# Patient Record
Sex: MALE | Race: WHITE | NOT HISPANIC OR LATINO | Employment: OTHER | URBAN - METROPOLITAN AREA
[De-identification: names, ages, dates, MRNs, and addresses within clinical notes are randomized per-mention and may not be internally consistent; named-entity substitution may affect disease eponyms.]

---

## 2024-10-22 ENCOUNTER — OUTSIDE FACILITY SERVICE (OUTPATIENT)
Dept: PAIN MEDICINE | Facility: CLINIC | Age: 79
End: 2024-10-22
Payer: MEDICARE

## 2024-10-22 ENCOUNTER — DOCUMENTATION (OUTPATIENT)
Dept: PAIN MEDICINE | Facility: CLINIC | Age: 79
End: 2024-10-22

## 2024-10-22 PROCEDURE — 99152 MOD SED SAME PHYS/QHP 5/>YRS: CPT | Performed by: STUDENT IN AN ORGANIZED HEALTH CARE EDUCATION/TRAINING PROGRAM

## 2024-10-22 PROCEDURE — 64483 NJX AA&/STRD TFRM EPI L/S 1: CPT | Performed by: STUDENT IN AN ORGANIZED HEALTH CARE EDUCATION/TRAINING PROGRAM

## 2024-10-22 PROCEDURE — 64484 NJX AA&/STRD TFRM EPI L/S EA: CPT | Performed by: STUDENT IN AN ORGANIZED HEALTH CARE EDUCATION/TRAINING PROGRAM

## 2024-10-22 NOTE — PROGRESS NOTES
[facility]  [address] [place] [address]      PROCEDURE: Fluoroscopically-guided  Lumbar Transforaminal Epidural Steroid Injection - LEFT L4/5 and L5/S1    PRE-OP DIAGNOSIS: Lumbar radiculopathy  POST-OP DIAGNOSIS: Lumbar radiculopathy    BLOOD THINNERS (ANTIPLATELETS/ANTICOAGULANTS): Were discussed with the patient and REVA Guidelines were followed.     CONSENT: Risks, benefits and options were explained to the patient, all questions were answered and written informed consent was obtained.     ANESTHESIA: Moderate sedation was required to maintain comfort, safety, and cooperation during the procedure.  The duration of sedation service was over 10 minutes.  Patient received 2mg IV Versed and 50mcg IV fentanyl.  Independent observation and monitoring was performed by Georgiana Du.  The patient's level of consciousness and physiologic status was continually monitored with pulse oximetry, EKG from 0827 to 0846.  There were no complications or adverse events during sedation.  After the sedation patient was taken to the recovery area.      PROCEDURE NOTE: A pre-procedural time out was performed to confirm the correct patient, procedure, side, and site. Standard ASA monitors were applied and oxygen via nasal cannula was provided. All proceduralists donned sterile gloves with masks and surgical hats. The patient was placed prone with pillow under the abdomen and all pressure points padded. The patient's lumbar spine was prepped in standard fashion using Chlorhexidine and draped with sterile towels. The target neuroforamen was identified using oblique fluoroscopy and the superior vertebral body endplate squared. The overlying skin and subcutaneous tissue was anesthetized with 1% lidocaine. A 22 gauge 3.5 inch spinal needle with bent tip was incrementally advanced using intermittent fluoroscopy to the 6 o'clock position of the target pedicle in the mid-neuroforamen using  oblique, AP and lateral intermittent fluoroscopy. After negative aspiration of blood and cerebrospinal fluid, needle placement was confirmed with 1 ml of omnipaque 180 mgI/ml contrast using AP fluoroscopic imaging. Imaging revealed a clear outline of the target spinal nerve with proximal spread of agent through the neuroforamen medially to the epidural space, without evidence of intravascular or intrathecal spread. After negative aspiration, a mixture containing dexamethasone 5 mg steroid and lidocaine 1% - 1 ml local anesthetic for a total volume of 1.5 ml was injected under direct visualization with fluoroscopy. The needle was flushed, removed and a bandage applied.  The same procedure utilizing the same technique was performed at each target level listed above.    EBL: None     COMPLICATIONS: None     The patient was monitored in recovery area until all discharge criteria were met. Vital signs remained stable throughout the procedure and in the recovery area. There were no immediate complications and the patient tolerated the procedure well. Sensory and motor exam was unchanged from baseline. The patient received written discharge instructions prior to discharge.     FOLLOW UP: As scheduled     ADDITIONAL NOTES: []        [facility]       Miky Zambrano MD     CODES:  [id number]  [id number]  [id number]

## 2024-11-04 ENCOUNTER — TELEPHONE (OUTPATIENT)
Dept: PAIN MEDICINE | Facility: CLINIC | Age: 79
End: 2024-11-04

## 2024-11-04 ENCOUNTER — PATIENT MESSAGE (OUTPATIENT)
Dept: PAIN MEDICINE | Facility: CLINIC | Age: 79
End: 2024-11-04
Payer: MEDICARE

## 2024-11-04 NOTE — TELEPHONE ENCOUNTER
Caller: TRICIA    Relationship: SELF    Best call back number: [phone]    What is the best time to reach you: ANY    Who are you requesting to speak with (clinical staff, provider,  specific staff member): CLINICAL    What was the call regarding: EPIDURAL WAS DONE 2 WKS AGO AND DID NOT RELIEVE ANY PAIN- IF ANYTHING PAIN HAS GOTTEN WORSE- HE WOULD LIKE RX FOR SOMETHING TO SLEEP AT NIGHT- IS CURRENTLY TAKING HYDROCODONE LAST NIGHT HE TOOK THE LAST PILL- HAS TAKEN OXYCODONE IN THE PAST AS WELL- HE WOULD PREFER OXYCODONE- HE HAS MRI SCHEDULED ORDERED BY DR MEJIAS- HAS APPT WITH DR MEJIAS ON FRIDAY AS WELL- PLEASE CALL-

## 2024-11-05 DIAGNOSIS — M54.16 LUMBAR RADICULOPATHY: Primary | ICD-10-CM

## 2024-11-05 RX ORDER — GABAPENTIN 300 MG/1
300 CAPSULE ORAL
Qty: 30 CAPSULE | Refills: 0 | Status: SHIPPED | OUTPATIENT
Start: 2024-11-05 | End: 2024-11-08 | Stop reason: SDUPTHER

## 2024-11-05 NOTE — TELEPHONE ENCOUNTER
"Surgery/Procedure:   Lumbar Transforaminal Epidural Steroid Injection - LEFT L4/5 and L5/S1   Surgery/Procedure Date:  10/22/2024  Last visit:   Office Visit with Miky Zambrano MD (10/14/2024)   Next visit: 11/20/2024     Reason for call:    Copied from HUB:    \"Epidural was done 2 wks ago and did not relieve any pain- if anything pain has gotten worse- he would like RX for something to sleep at night- is currently taking hydrocodone last night he took the last pill- has taken oxycodone in the past as well- he would prefer oxycodone- he has MRI scheduled ordered by Dr. Hamilton- has appt with Dr. Hamilton on Friday as well- please call-'    Patient also sent a [employer] message:    \"Dr. Zambrano,     I'm afraid the Epidural Steroid Injection has had no effect on my pain. In fact, it's gotten worse.     I need a prescription for pain medicine for use so I can sleep with the pain. I only take it at night.     I've had Hydrocodone, Oxycodone, and Oxycontin after my knee and back surgeries.      I think Oxycodone would be the best for this situation.      I just used my last Hydrocodone tablet and would like something today.      I have an MRI scheduled for Wednesday and an appointment with Dr. Comer on Friday. Hopefully, something will show up as the cause.     Thank you.\"  "

## 2024-11-08 ENCOUNTER — OFFICE VISIT (OUTPATIENT)
Dept: NEUROSURGERY | Facility: CLINIC | Age: 79
End: 2024-11-08
Payer: MEDICARE

## 2024-11-08 VITALS — BODY MASS INDEX: 32.18 KG/M2 | WEIGHT: 205 LBS | TEMPERATURE: 98.7 F | HEIGHT: 67 IN

## 2024-11-08 DIAGNOSIS — M47.819 FACET ARTHROPATHY: ICD-10-CM

## 2024-11-08 DIAGNOSIS — M54.16 LUMBAR RADICULOPATHY: Primary | ICD-10-CM

## 2024-11-08 DIAGNOSIS — M51.9 LUMBAR DISC DISEASE: ICD-10-CM

## 2024-11-08 PROCEDURE — 1159F MED LIST DOCD IN RCRD: CPT | Performed by: NEUROLOGICAL SURGERY

## 2024-11-08 PROCEDURE — 1160F RVW MEDS BY RX/DR IN RCRD: CPT | Performed by: NEUROLOGICAL SURGERY

## 2024-11-08 PROCEDURE — 99213 OFFICE O/P EST LOW 20 MIN: CPT | Performed by: NEUROLOGICAL SURGERY

## 2024-11-08 RX ORDER — GABAPENTIN 300 MG/1
300 CAPSULE ORAL 3 TIMES DAILY
Qty: 90 CAPSULE | Refills: 2 | Status: SHIPPED | OUTPATIENT
Start: 2024-11-08

## 2024-11-08 NOTE — PROGRESS NOTES
Patient: Julian Yan  : 1945    Primary Care Provider: Rahul Randolph DO    Requesting Provider: As above        History    Chief Complaint: Low back left hip, left leg pain.    History of Present Illness: Mr. Yan is an 79-year-old gentleman is known to my service.  Given progressive back and lower extremity pain with walking and standing intolerance he underwent decompressive laminectomies at L2-3 and L3-4 on 2024.  In the  he did undergone lumbar surgery as well.  When I saw him last in  he was doing well.  However by August he had begun having pain in his low back and left hip that extends into the side of his left calf.  When he for stands up in the morning he is miserable.  After he is up and moving a bit it does erin somewhat.  He has fallen on a couple of occasions.  He reports no bowel or bladder dysfunction.  He has no chest or abdominal symptoms.  An epidural injection by Dr. Zambrano made him worse.    Review of Systems   Constitutional:  Negative for activity change, appetite change, chills, diaphoresis, fatigue, fever and unexpected weight change.   HENT:  Negative for congestion, dental problem, drooling, ear discharge, ear pain, facial swelling, hearing loss, mouth sores, nosebleeds, postnasal drip, rhinorrhea, sinus pressure, sinus pain, sneezing, sore throat, tinnitus, trouble swallowing and voice change.    Eyes:  Negative for photophobia, pain, discharge, redness, itching and visual disturbance.   Respiratory:  Negative for apnea, cough, choking, chest tightness, shortness of breath, wheezing and stridor.    Cardiovascular:  Negative for chest pain, palpitations and leg swelling.   Gastrointestinal:  Negative for abdominal distention, abdominal pain, anal bleeding, blood in stool, constipation, diarrhea, nausea, rectal pain and vomiting.   Endocrine: Negative for cold intolerance, heat intolerance, polydipsia, polyphagia and polyuria.   Genitourinary:   "Negative for decreased urine volume, difficulty urinating, dysuria, enuresis, flank pain, frequency, genital sores, hematuria, penile discharge, penile pain, penile swelling, scrotal swelling, testicular pain and urgency.   Musculoskeletal:  Positive for arthralgias, back pain and gait problem. Negative for joint swelling, myalgias, neck pain and neck stiffness.   Skin:  Negative for color change, pallor, rash and wound.   Allergic/Immunologic: Negative for environmental allergies, food allergies and immunocompromised state.   Neurological:  Negative for dizziness, tremors, seizures, syncope, facial asymmetry, speech difficulty, weakness, light-headedness, numbness and headaches.   Hematological:  Negative for adenopathy. Does not bruise/bleed easily.   Psychiatric/Behavioral:  Negative for agitation, behavioral problems, confusion, decreased concentration, dysphoric mood, hallucinations, self-injury, sleep disturbance and suicidal ideas. The patient is not nervous/anxious and is not hyperactive.    All other systems reviewed and are negative.    The patient's past medical history, past surgical history, family history, and social history have been reviewed at length in the electronic medical record.      Physical Exam:   Temp 98.7 °F (37.1 °C) (Temporal)   Ht 170.2 cm (67\")   Wt 93 kg (205 lb)   BMI 32.11 kg/m²   MUSCULOSKELETAL:  Straight leg raising is negative.  Tashi's Sign is negative.  ROM in the low back is normal.  Tenderness in the back to palpation is not observed.  NEUROLOGICAL:  Strength is intact in the lower extremities to direct testing.  Muscle tone is normal throughout.  Ambulates with a cane.  Sensation is intact to light touch testing throughout.  Deep tendon reflexes are difficult to elicit throughout.  Coordination is intact.      Medical Decision Making    Data Review:   (All imaging studies were personally reviewed unless stated otherwise)  Thoracic MRI study dated 11/6/2024 demonstrates " some signal change within the cord at the T11-12 level where there is mild to moderate canal narrowing.  This is noted on his previous MRI from earlier in the year.    MRI of the lumbar spine dated 9/13/2024 demonstrates changes related to his recent laminectomies.  There is some disc or osteophyte to the left at the L4-5 level that potentially could cause some leg symptoms in the distribution he is described.  There is a laminotomy defect on the left at that level.    Diagnosis:   Lumbar radiculopathy.    Treatment Options:   I have increased his gabapentin to 300 mg 3 times a day.  I am going to check electrodiagnostic studies of his left lower extremity as well as a CT scan to see whether we are dealing with soft disc protrusion or more osteophyte.  He will follow-up with the above      Scribed for Paulino Comer MD by Lauren Muñiz CMA on 11/8/2024 11:08 EST       I, Dr. Comer, personally performed the services described in the documentation, as scribed in my presence, and it is both accurate and complete.

## 2024-11-13 DIAGNOSIS — M54.16 LUMBAR RADICULOPATHY: Primary | ICD-10-CM

## 2024-11-13 RX ORDER — HYDROCODONE BITARTRATE AND ACETAMINOPHEN 5; 325 MG/1; MG/1
1 TABLET ORAL 2 TIMES DAILY PRN
Qty: 30 TABLET | Refills: 0 | Status: SHIPPED | OUTPATIENT
Start: 2024-11-13

## 2024-11-22 ENCOUNTER — TELEPHONE (OUTPATIENT)
Dept: NEUROSURGERY | Facility: CLINIC | Age: 79
End: 2024-11-22

## 2024-11-22 ENCOUNTER — OFFICE VISIT (OUTPATIENT)
Dept: NEUROSURGERY | Facility: CLINIC | Age: 79
End: 2024-11-22
Payer: MEDICARE

## 2024-11-22 VITALS — BODY MASS INDEX: 32.47 KG/M2 | HEIGHT: 67 IN | WEIGHT: 206.9 LBS | TEMPERATURE: 98.4 F

## 2024-11-22 DIAGNOSIS — M54.16 LUMBAR RADICULOPATHY: Primary | ICD-10-CM

## 2024-11-22 DIAGNOSIS — M51.9 LUMBAR DISC DISEASE: ICD-10-CM

## 2024-11-22 PROCEDURE — 99213 OFFICE O/P EST LOW 20 MIN: CPT | Performed by: NEUROLOGICAL SURGERY

## 2024-11-22 PROCEDURE — 1160F RVW MEDS BY RX/DR IN RCRD: CPT | Performed by: NEUROLOGICAL SURGERY

## 2024-11-22 PROCEDURE — 1159F MED LIST DOCD IN RCRD: CPT | Performed by: NEUROLOGICAL SURGERY

## 2024-11-22 NOTE — PROGRESS NOTES
Patient: Julian Yan  : 1945    Primary Care Provider: Rahul Randolph DO    Requesting Provider: As above        History    Chief Complaint: Low back, left hip, left leg pain.    History of Present Illness: Mr. Yan is an 79-year-old gentleman is known to my service. Given progressive back and lower extremity pain with walking and standing intolerance he underwent decompressive laminectomies at L2-3 and L3-4 on 2024. In the  he did undergone lumbar surgery as well. When I saw him last in  he was doing well. However by August he had begun having pain in his low back and left hip that extends into the side of his left calf. When he for stands up in the morning he is miserable. After he is up and moving a bit it does erin somewhat. He has fallen on a couple of occasions. He reports no bowel or bladder dysfunction. He has no chest or abdominal symptoms. An epidural injection by Dr. Zambrano made him worse.  His main complaint now is his left leg giving out.  He has had several falls.  He is now using a cane regularly.  His pain seems to be a bit more tolerable.  The pain involves the left buttock and then the left lateral calf.    Review of Systems   Constitutional:  Negative for activity change, appetite change, chills, diaphoresis, fatigue, fever and unexpected weight change.   HENT:  Negative for congestion, dental problem, drooling, ear discharge, ear pain, facial swelling, hearing loss, mouth sores, nosebleeds, postnasal drip, rhinorrhea, sinus pressure, sinus pain, sneezing, sore throat, tinnitus, trouble swallowing and voice change.    Eyes:  Negative for photophobia, pain, discharge, redness, itching and visual disturbance.   Respiratory:  Negative for apnea, cough, choking, chest tightness, shortness of breath, wheezing and stridor.    Cardiovascular:  Negative for chest pain, palpitations and leg swelling.   Gastrointestinal:  Negative for abdominal distention,  abdominal pain, anal bleeding, blood in stool, constipation, diarrhea, nausea, rectal pain and vomiting.   Endocrine: Negative for cold intolerance, heat intolerance, polydipsia, polyphagia and polyuria.   Genitourinary:  Negative for decreased urine volume, difficulty urinating, dysuria, enuresis, flank pain, frequency, genital sores, hematuria, penile discharge, penile pain, penile swelling, scrotal swelling, testicular pain and urgency.   Musculoskeletal:  Positive for arthralgias and gait problem. Negative for back pain, joint swelling, myalgias, neck pain and neck stiffness.   Skin:  Negative for color change, pallor, rash and wound.   Allergic/Immunologic: Negative for environmental allergies, food allergies and immunocompromised state.   Neurological:  Positive for weakness. Negative for dizziness, tremors, seizures, syncope, facial asymmetry, speech difficulty, light-headedness, numbness and headaches.   Hematological:  Negative for adenopathy. Does not bruise/bleed easily.   Psychiatric/Behavioral:  Negative for agitation, behavioral problems, confusion, decreased concentration, dysphoric mood, hallucinations, self-injury, sleep disturbance and suicidal ideas. The patient is not nervous/anxious and is not hyperactive.    All other systems reviewed and are negative.      The patient's past medical history, past surgical history, family history, and social history have been reviewed at length in the electronic medical record.      Physical Exam:   Deferred    Medical Decision Making    Data Review:   (All imaging studies were personally reviewed unless stated otherwise)  Thoracic MRI study dated 11/6/2024 demonstrates some signal change within the cord at the T11-12 level where there is mild to moderate canal narrowing.  This is noted on his previous MRI from earlier in the year.     MRI of the lumbar spine dated 9/13/2024 demonstrates changes related to his recent laminectomies.  There is some disc or  osteophyte to the left at the L4-5 level that potentially could cause some leg symptoms in the distribution he is described.  There is a laminotomy defect on the left at that level.    CT of the lumbar spine demonstrates degenerative changes.  The finding on the left at L4-5 likely represents some degree of disc protrusion or scar there may be a small calcified portion of the but the entire finding is not a large osteophyte.    Electrodiagnostic studies suggest mild chronic left L4/5 radiculopathy.    Diagnosis:   1.  Left lower extremity weakness of uncertain etiology.  2.  Possible left L5 radiculopathy.    Treatment Options:   I have referred her to physical therapy to work on strengthening of his left leg.  If his pain becomes a more prominent issue then I would consider redo foraminotomy on the left at L4-5 with possible discectomy.  This would be more likely to help with his pain than with his left lower extremity weakness.  He will follow-up with me in several weeks.      Scribed for Paulino Comer MD by Lauren Muñiz CMA on 11/22/2024 10:13 EST       I, Dr. Comer, personally performed the services described in the documentation, as scribed in my presence, and it is both accurate and complete.

## 2024-12-06 DIAGNOSIS — M54.16 LUMBAR RADICULOPATHY: ICD-10-CM

## 2024-12-06 NOTE — TELEPHONE ENCOUNTER
Provider:  Souleymane  Surgery/Procedure:  Lumbar laminectomy decompression, L2-3, L4-5  Surgery/Procedure Date:  4/22/24  Last visit:   11/22/24  Next visit: 12/18/24     Reason for call:  Refill request for Norco pending.     Kp:    1 11/15/2024 [id number] Gabapentin 300MG [facility] L737 90.00 30 04 [place]  New 11/08/2024 [facility]  1 11/13/2024 [id number] Hydrocodone Bitartrate/Ac   325MG/5MG  [facility] L737 30.00 15 10 04 [place]  New 11/13/2024 [facility]  1 11/05/2024 [id number] Gabapentin 300MG Miky Zambrano [facility] L737 30.00 30 04 [place]  New 11/05/2024 [facility]

## 2024-12-09 RX ORDER — HYDROCODONE BITARTRATE AND ACETAMINOPHEN 5; 325 MG/1; MG/1
1 TABLET ORAL 2 TIMES DAILY PRN
Qty: 30 TABLET | Refills: 0 | Status: SHIPPED | OUTPATIENT
Start: 2024-12-09

## 2024-12-18 ENCOUNTER — OFFICE VISIT (OUTPATIENT)
Dept: NEUROSURGERY | Facility: CLINIC | Age: 79
End: 2024-12-18
Payer: MEDICARE

## 2024-12-18 ENCOUNTER — PREP FOR SURGERY (OUTPATIENT)
Dept: OTHER | Facility: HOSPITAL | Age: 79
End: 2024-12-18
Payer: MEDICARE

## 2024-12-18 VITALS — BODY MASS INDEX: 31.66 KG/M2 | HEIGHT: 67 IN | WEIGHT: 201.7 LBS | TEMPERATURE: 98 F

## 2024-12-18 DIAGNOSIS — M51.9 LUMBAR DISC DISEASE: ICD-10-CM

## 2024-12-18 DIAGNOSIS — M51.26 HNP (HERNIATED NUCLEUS PULPOSUS), LUMBAR: Primary | ICD-10-CM

## 2024-12-18 DIAGNOSIS — M47.819 FACET ARTHROPATHY: ICD-10-CM

## 2024-12-18 DIAGNOSIS — M54.16 LUMBAR RADICULOPATHY: Primary | ICD-10-CM

## 2024-12-18 DIAGNOSIS — Z98.890 S/P LUMBAR LAMINECTOMY: ICD-10-CM

## 2024-12-18 PROCEDURE — 99214 OFFICE O/P EST MOD 30 MIN: CPT | Performed by: NEUROLOGICAL SURGERY

## 2024-12-18 RX ORDER — CHLORHEXIDINE GLUCONATE 40 MG/ML
SOLUTION TOPICAL
Qty: 120 ML | Refills: 0 | Status: SHIPPED | OUTPATIENT
Start: 2024-12-18

## 2024-12-18 RX ORDER — FAMOTIDINE 20 MG/1
20 TABLET, FILM COATED ORAL
OUTPATIENT
Start: 2024-12-18

## 2024-12-18 NOTE — PROGRESS NOTES
Patient: Julian Yan  : 1945    Primary Care Provider: Rahul Randolph DO    Requesting Provider: As above        History    Chief Complaint: Low back and left leg pain with lower extremity weakness.    History of Present Illness: Mr. Yan is an 79-year-old gentleman is known to my service. Given progressive back and lower extremity pain with walking and standing intolerance he underwent decompressive laminectomies at L2-3 and L3-4 on 2024. In the  he did undergone lumbar surgery as well. When I saw him last in  he was doing well. However by August he had begun having pain in his low back and left hip that extends into the side of his left calf. When he for stands up in the morning he is miserable. After he is up and moving a bit it does erin somewhat. He has fallen on a couple of occasions. He reports no bowel or bladder dysfunction. He has no chest or abdominal symptoms. An epidural injection by Dr. Zambrano made him worse.  His main complaint now is his left leg giving out.  He has had several falls.  He is now using a cane regularly.  His pain seems to be a bit more tolerable.  The pain involves the left buttock and then the left lateral calf.  The gabapentin was not helpful and he stopped it.  If he coughs or sneezes then the pain extends into his left lateral calf and his leg feels wobbly on him.    Review of Systems   Constitutional:  Negative for activity change, appetite change, chills, diaphoresis, fatigue, fever and unexpected weight change.   HENT:  Negative for congestion, dental problem, drooling, ear discharge, ear pain, facial swelling, hearing loss, mouth sores, nosebleeds, postnasal drip, rhinorrhea, sinus pressure, sinus pain, sneezing, sore throat, tinnitus, trouble swallowing and voice change.    Eyes:  Negative for photophobia, pain, discharge, redness, itching and visual disturbance.   Respiratory:  Negative for apnea, cough, choking, chest  "tightness, shortness of breath, wheezing and stridor.    Cardiovascular:  Negative for chest pain, palpitations and leg swelling.   Gastrointestinal:  Negative for abdominal distention, abdominal pain, anal bleeding, blood in stool, constipation, diarrhea, nausea, rectal pain and vomiting.   Endocrine: Negative for cold intolerance, heat intolerance, polydipsia, polyphagia and polyuria.   Genitourinary:  Negative for decreased urine volume, difficulty urinating, dysuria, enuresis, flank pain, frequency, genital sores, hematuria, penile discharge, penile pain, penile swelling, scrotal swelling, testicular pain and urgency.   Musculoskeletal:  Positive for back pain and gait problem. Negative for arthralgias, joint swelling, myalgias, neck pain and neck stiffness.   Skin:  Negative for color change, pallor, rash and wound.   Allergic/Immunologic: Negative for environmental allergies, food allergies and immunocompromised state.   Neurological:  Positive for weakness. Negative for dizziness, tremors, seizures, syncope, facial asymmetry, speech difficulty, light-headedness, numbness and headaches.   Hematological:  Negative for adenopathy. Does not bruise/bleed easily.   Psychiatric/Behavioral:  Negative for agitation, behavioral problems, confusion, decreased concentration, dysphoric mood, hallucinations, self-injury, sleep disturbance and suicidal ideas. The patient is not nervous/anxious and is not hyperactive.      The patient's past medical history, past surgical history, family history, and social history have been reviewed at length in the electronic medical record.      Physical Exam:   Temp 98 °F (36.7 °C) (Infrared)   Ht 170.2 cm (67.01\")   Wt 91.5 kg (201 lb 11.2 oz)   BMI 31.58 kg/m²   Straight leg raising is negative.  Strength is intact in his lower extremities.  Sensation is intact to light touch testing.  Reflexes are difficult to elicit throughout.    Medical Decision Making    Data Review:   (All " imaging studies were personally reviewed unless stated otherwise)  Thoracic MRI study dated 11/6/2024 demonstrates some signal change within the cord at the T11-12 level where there is mild to moderate canal narrowing.  This is noted on his previous MRI from earlier in the year.     MRI of the lumbar spine dated 9/13/2024 demonstrates changes related to his recent laminectomies.  There is some disc or osteophyte to the left at the L4-5 level that potentially could cause some leg symptoms in the distribution he is described.  In reviewing the imaging again I think there may be some extruded disc below the disc space.  There is a laminotomy defect on the left at that level.     CT of the lumbar spine demonstrates degenerative changes.  The finding on the left at L4-5 likely represents some degree of disc protrusion or scar there may be a small calcified portion of the disc.  There is not a large osteophyte.     Electrodiagnostic studies suggest mild chronic left L4/5 radiculopathy.    Diagnosis:   Left L5 radiculopathy due to recess and foraminal stenosis with disc herniation.    Treatment Options:   Mr. Yan is having intolerable symptoms at this point.  I have recommended redo foraminotomy on the left at L4-5 with probable discectomy.  I think this will help with at least some of his symptoms.  Whether it will correct his gait instability is uncertain.  The nature of the procedure as well as the potential risks, complications, limitations, and alternatives to the procedure were discussed at length with the patient and the patient has agreed to proceed with surgery.      Scribed for Paulino Comer MD by Fiona Enriquez MA on 12/18/2024 10:25 EST      I, Dr. Comer, personally performed the services described in the documentation, as scribed in my presence, and it is both accurate and complete.

## 2024-12-18 NOTE — H&P
Patient: Julian Yan  : 1945     Primary Care Provider: Rahul Randolph DO     Requesting Provider: As above           History     Chief Complaint: Low back and left leg pain with lower extremity weakness.     History of Present Illness: Mr. Yan is an 79-year-old gentleman is known to my service. Given progressive back and lower extremity pain with walking and standing intolerance he underwent decompressive laminectomies at L2-3 and L3-4 on 2024. In the  he did undergone lumbar surgery as well. When I saw him last in  he was doing well. However by August he had begun having pain in his low back and left hip that extends into the side of his left calf. When he for stands up in the morning he is miserable. After he is up and moving a bit it does erin somewhat. He has fallen on a couple of occasions. He reports no bowel or bladder dysfunction. He has no chest or abdominal symptoms. An epidural injection by Dr. Zambrano made him worse.  His main complaint now is his left leg giving out.  He has had several falls.  He is now using a cane regularly.  His pain seems to be a bit more tolerable.  The pain involves the left buttock and then the left lateral calf.  The gabapentin was not helpful and he stopped it.  If he coughs or sneezes then the pain extends into his left lateral calf and his leg feels wobbly on him.     Review of Systems   Constitutional:  Negative for activity change, appetite change, chills, diaphoresis, fatigue, fever and unexpected weight change.   HENT:  Negative for congestion, dental problem, drooling, ear discharge, ear pain, facial swelling, hearing loss, mouth sores, nosebleeds, postnasal drip, rhinorrhea, sinus pressure, sinus pain, sneezing, sore throat, tinnitus, trouble swallowing and voice change.    Eyes:  Negative for photophobia, pain, discharge, redness, itching and visual disturbance.   Respiratory:  Negative for apnea, cough, choking, chest  tightness, shortness of breath, wheezing and stridor.    Cardiovascular:  Negative for chest pain, palpitations and leg swelling.   Gastrointestinal:  Negative for abdominal distention, abdominal pain, anal bleeding, blood in stool, constipation, diarrhea, nausea, rectal pain and vomiting.   Endocrine: Negative for cold intolerance, heat intolerance, polydipsia, polyphagia and polyuria.   Genitourinary:  Negative for decreased urine volume, difficulty urinating, dysuria, enuresis, flank pain, frequency, genital sores, hematuria, penile discharge, penile pain, penile swelling, scrotal swelling, testicular pain and urgency.   Musculoskeletal:  Positive for back pain and gait problem. Negative for arthralgias, joint swelling, myalgias, neck pain and neck stiffness.   Skin:  Negative for color change, pallor, rash and wound.   Allergic/Immunologic: Negative for environmental allergies, food allergies and immunocompromised state.   Neurological:  Positive for weakness. Negative for dizziness, tremors, seizures, syncope, facial asymmetry, speech difficulty, light-headedness, numbness and headaches.   Hematological:  Negative for adenopathy. Does not bruise/bleed easily.   Psychiatric/Behavioral:  Negative for agitation, behavioral problems, confusion, decreased concentration, dysphoric mood, hallucinations, self-injury, sleep disturbance and suicidal ideas. The patient is not nervous/anxious and is not hyperactive.       The patient's past medical history, past surgical history, family history, and social history have been reviewed at length in the electronic medical record.     Past Medical History:   Diagnosis Date    Abnormal ECG 10/20/2022    Allergic rhinitis     Environmental    Ankle sprain     Arthritis     Arthritis of back     BPH (benign prostatic hyperplasia)     Bursitis of hip     CKD (chronic kidney disease), stage III     Colon polyp     Sessile adenomatous colon polyp    CTS (carpal tunnel syndrome)      Diverticulosis     Gout     Hemorrhoids     Hip arthrosis     Hypertension     Knee swelling     Low back pain     Low back strain     Lumbosacral disc disease     Periarthritis of shoulder     Peripheral neuropathy     Renal insufficiency     Shingles     Stress headaches     Tendinitis of knee     Wears glasses      Past Surgical History:   Procedure Laterality Date    APPENDECTOMY      BACK SURGERY Left     Left L4-5 lumbar discectomy    CARPAL TUNNEL RELEASE Left 2021    Left carpal tunnel release; Dr. LELAND Nelson; [facility]    CARPAL TUNNEL RELEASE Right 2020    COLONOSCOPY      EPIDURAL BLOCK      FOOT SURGERY Right     Calcium deposit removed from right foot.    HYDROCELE EXCISION / REPAIR      JOINT REPLACEMENT      Left Knee    LUMBAR LAMINECTOMY DISCECTOMY DECOMPRESSION Bilateral 2024    Procedure: LUMBAR LAMINCECTOMY DECOMPRESSION, L2-3, L4-5;  Surgeon: Paulino Comer MD;  Location:  [facility];  Service: Neurosurgery;  Laterality: Bilateral;    SKIN TAG REMOVAL      Multiple    TOTAL KNEE ARTHROPLASTY Left 2023    Procedure: TOTAL KNEE ARTHROPLASTY - LEFT;  Surgeon: Jeison Nelson MD;  Location:  [facility];  Service: Orthopedics;  Laterality: Left;    VASECTOMY       Family History   Problem Relation Age of Onset    COPD Mother     Hypertension Mother     Heart failure Father     Diabetes Father     Osteoarthritis Sister     Osteoarthritis Brother     Drug abuse Son     Early death Son         Drug overdose    Early death Daughter         Accident caused by DUI     Alcohol abuse Daughter      Social History     Socioeconomic History    Marital status:     Number of children: 5   Tobacco Use    Smoking status: Former     Current packs/day: 0.00     Average packs/day: 0.3 packs/day for 7.4 years (2.5 ttl pk-yrs)     Types: Cigarettes     Start date: 9/3/1962     Quit date: 1967     Years since quittin.6     Passive exposure: Past    Smokeless  "tobacco: Never    Tobacco comments:     Haven't smoked since 1967   Vaping Use    Vaping status: Never Used   Substance and Sexual Activity    Alcohol use: No    Drug use: Never    Sexual activity: Not Currently     Partners: Female     Birth control/protection: Vasectomy           Allergies   Allergen Reactions    Diclo Gel [Diclofenac Sodium] Rash    Nsaids Other (See Comments)     \"Can't take NSAIDS because of my kidneys\" (early stages of CKD)    Salonpas [Camphor-Menthol-Methyl Sal] Hives       Current Outpatient Medications on File Prior to Visit   Medication Sig Dispense Refill    cholecalciferol (VITAMIN D3) 25 MCG (1000 UT) tablet Take 1 tablet by mouth Daily.      HYDROcodone-acetaminophen (NORCO) 5-325 MG per tablet Take 1 tablet by mouth 2 (Two) Times a Day As Needed for Moderate Pain. 30 tablet 0    losartan (COZAAR) 100 MG tablet Take 1 tablet by mouth Daily. 90 tablet 3    [DISCONTINUED] gabapentin (Neurontin) 300 MG capsule Take 1 capsule by mouth 3 (Three) Times a Day. (Patient not taking: Reported on 12/18/2024) 90 capsule 2     No current facility-administered medications on file prior to visit.          Physical Exam:   Temp 98 °F (36.7 °C) (Infrared)   Ht 170.2 cm (67.01\")   Wt 91.5 kg (201 lb 11.2 oz)   BMI 31.58 kg/m²   Straight leg raising is negative.  Strength is intact in his lower extremities.  Sensation is intact to light touch testing.  Reflexes are difficult to elicit throughout.     Medical Decision Making     Data Review:   (All imaging studies were personally reviewed unless stated otherwise)  Thoracic MRI study dated 11/6/2024 demonstrates some signal change within the cord at the T11-12 level where there is mild to moderate canal narrowing.  This is noted on his previous MRI from earlier in the year.     MRI of the lumbar spine dated 9/13/2024 demonstrates changes related to his recent laminectomies.  There is some disc or osteophyte to the left at the L4-5 level that " potentially could cause some leg symptoms in the distribution he is described.  In reviewing the imaging again I think there may be some extruded disc below the disc space.  There is a laminotomy defect on the left at that level.     CT of the lumbar spine demonstrates degenerative changes.  The finding on the left at L4-5 likely represents some degree of disc protrusion or scar there may be a small calcified portion of the disc.  There is not a large osteophyte.     Electrodiagnostic studies suggest mild chronic left L4/5 radiculopathy.     Diagnosis:   Left L5 radiculopathy due to recess and foraminal stenosis with disc herniation.     Treatment Options:   Mr. Yan is having intolerable symptoms at this point.  I have recommended redo foraminotomy on the left at L4-5 with probable discectomy.  I think this will help with at least some of his symptoms.  Whether it will correct his gait instability is uncertain.  The nature of the procedure as well as the potential risks, complications, limitations, and alternatives to the procedure were discussed at length with the patient and the patient has agreed to proceed with surgery.

## 2024-12-30 ENCOUNTER — ANESTHESIA EVENT (OUTPATIENT)
Dept: PERIOP | Facility: HOSPITAL | Age: 79
End: 2024-12-30
Payer: MEDICARE

## 2024-12-30 ENCOUNTER — PRE-ADMISSION TESTING (OUTPATIENT)
Dept: PREADMISSION TESTING | Facility: HOSPITAL | Age: 79
End: 2024-12-30
Payer: MEDICARE

## 2024-12-30 VITALS — HEIGHT: 67 IN | BODY MASS INDEX: 31.56 KG/M2 | WEIGHT: 201.06 LBS

## 2024-12-30 DIAGNOSIS — M51.26 HNP (HERNIATED NUCLEUS PULPOSUS), LUMBAR: ICD-10-CM

## 2024-12-30 LAB
DEPRECATED RDW RBC AUTO: 42.8 FL (ref 37–54)
ERYTHROCYTE [DISTWIDTH] IN BLOOD BY AUTOMATED COUNT: 12.4 % (ref 12.3–15.4)
HBA1C MFR BLD: 5.4 % (ref 4.8–5.6)
HCT VFR BLD AUTO: 40.4 % (ref 37.5–51)
HGB BLD-MCNC: 13.5 G/DL (ref 13–17.7)
MCH RBC QN AUTO: 31.5 PG (ref 26.6–33)
MCHC RBC AUTO-ENTMCNC: 33.4 G/DL (ref 31.5–35.7)
MCV RBC AUTO: 94.4 FL (ref 79–97)
PLATELET # BLD AUTO: 227 10*3/MM3 (ref 140–450)
PMV BLD AUTO: 9.2 FL (ref 6–12)
POTASSIUM SERPL-SCNC: 4.8 MMOL/L (ref 3.5–5.2)
QT INTERVAL: 426 MS
QTC INTERVAL: 403 MS
RBC # BLD AUTO: 4.28 10*6/MM3 (ref 4.14–5.8)
WBC NRBC COR # BLD AUTO: 6.34 10*3/MM3 (ref 3.4–10.8)

## 2024-12-30 PROCEDURE — 93005 ELECTROCARDIOGRAM TRACING: CPT

## 2024-12-30 PROCEDURE — 83036 HEMOGLOBIN GLYCOSYLATED A1C: CPT

## 2024-12-30 PROCEDURE — 85027 COMPLETE CBC AUTOMATED: CPT

## 2024-12-30 PROCEDURE — 36415 COLL VENOUS BLD VENIPUNCTURE: CPT

## 2024-12-30 PROCEDURE — 84132 ASSAY OF SERUM POTASSIUM: CPT

## 2024-12-30 PROCEDURE — 87081 CULTURE SCREEN ONLY: CPT

## 2024-12-30 NOTE — PAT
An arrival time for procedure was not provided during PAT visit. If patient had any questions or concerns about their arrival time, they were instructed to contact their surgeon/physician.  Additionally, if the patient referred to an arrival time that was acquired from their my chart account, patient was encouraged to verify that time with their surgeon/physician. Arrival times are NOT provided in Pre Admission Testing Department.    Per Anesthesia Request, patient instructed not to take their ACE/ARB medications on the AM of surgery.    Patient denies any current skin issues.     Bactroban (if prescribed) and Chlorhexidine Prescription prescribed by physician before PAT visit.  Verified with patient that medication(s) were picked up from their pharmacy.  Written instructions given to patient during PAT visit.  Patient/family also instructed to complete skin prep checklist and return the checklist on the day of surgery to preoperative staff.  Patient/family verbalized understanding.    Patient to apply Chlorhexadine wipes  to surgical area (as instructed) the night before procedure and the AM of procedure. Wipes provided.    Patient viewed general PAT education video as instructed in their preoperative information received from their surgeon.  Patient stated the general PAT education video was viewed in its entirety and survey completed.  Copies of [facility] general education handouts (Incentive Spirometry, Meds to Beds Program, Patient Belongings, Pre-op skin preparation instructions, Blood Glucose testing, Visitor policy, Surgery FAQ, Code H) distributed to patient if not printed. Education related to the PAT pass and skin preparation for surgery (if applicable) completed in PAT as a reinforcement to PAT education video. Patient instructed to return PAT pass provided today as well as completed skin preparation sheet (if applicable) on the day of procedure.     Additionally if patient had not viewed video yet but  intended to view it at home or in our waiting area, then referred them to the handout with QR code/link provided during PAT visit.  Encouraged patient/family to read PAT general education handouts thoroughly and notify PAT staff with any questions or concerns. Patient verbalized understanding of all information and priority content.    Patient EKG on chart and in epic from 12/30/2024    Spoke to Dr Ayon. He reviewed and approved EKG. No further orders given.

## 2024-12-31 LAB — MRSA SPEC QL CULT: NORMAL

## 2025-01-06 ENCOUNTER — HOSPITAL ENCOUNTER (OUTPATIENT)
Facility: HOSPITAL | Age: 80
Discharge: HOME | End: 2025-01-06
Attending: NEUROLOGICAL SURGERY | Admitting: NEUROLOGICAL SURGERY
Payer: MEDICARE

## 2025-01-06 ENCOUNTER — APPOINTMENT (OUTPATIENT)
Dept: GENERAL RADIOLOGY | Facility: HOSPITAL | Age: 80
End: 2025-01-06
Payer: MEDICARE

## 2025-01-06 ENCOUNTER — ANESTHESIA (OUTPATIENT)
Dept: PERIOP | Facility: HOSPITAL | Age: 80
End: 2025-01-06
Payer: MEDICARE

## 2025-01-06 VITALS
WEIGHT: 201 LBS | TEMPERATURE: 97.2 F | BODY MASS INDEX: 31.55 KG/M2 | HEIGHT: 67 IN | RESPIRATION RATE: 14 BRPM | DIASTOLIC BLOOD PRESSURE: 94 MMHG | OXYGEN SATURATION: 95 % | HEART RATE: 52 BPM | SYSTOLIC BLOOD PRESSURE: 159 MMHG

## 2025-01-06 DIAGNOSIS — M54.16 LUMBAR RADICULOPATHY: ICD-10-CM

## 2025-01-06 DIAGNOSIS — M51.26 HNP (HERNIATED NUCLEUS PULPOSUS), LUMBAR: ICD-10-CM

## 2025-01-06 PROCEDURE — 25010000002 PROPOFOL 10 MG/ML EMULSION

## 2025-01-06 PROCEDURE — 25010000002 GLYCOPYRROLATE 1 MG/5ML SOLUTION

## 2025-01-06 PROCEDURE — 63710000001 MUPIROCIN 2 % OINTMENT: Performed by: NEUROLOGICAL SURGERY

## 2025-01-06 PROCEDURE — 25010000002 LIDOCAINE PF 1% 1 % SOLUTION: Performed by: ANESTHESIOLOGY

## 2025-01-06 PROCEDURE — C1713 ANCHOR/SCREW BN/BN,TIS/BN: HCPCS | Performed by: NEUROLOGICAL SURGERY

## 2025-01-06 PROCEDURE — 25010000002 LIDOCAINE PF 1% 1 % SOLUTION

## 2025-01-06 PROCEDURE — A9270 NON-COVERED ITEM OR SERVICE: HCPCS | Performed by: NEUROLOGICAL SURGERY

## 2025-01-06 PROCEDURE — 63710000001 HYDROCODONE-ACETAMINOPHEN 5-325 MG TABLET

## 2025-01-06 PROCEDURE — 25810000003 LACTATED RINGERS PER 1000 ML: Performed by: ANESTHESIOLOGY

## 2025-01-06 PROCEDURE — 25010000002 FENTANYL CITRATE (PF) 100 MCG/2ML SOLUTION

## 2025-01-06 PROCEDURE — 25010000002 SUGAMMADEX 500 MG/5ML SOLUTION

## 2025-01-06 PROCEDURE — A9270 NON-COVERED ITEM OR SERVICE: HCPCS

## 2025-01-06 PROCEDURE — 25010000002 ONDANSETRON PER 1 MG

## 2025-01-06 PROCEDURE — 25010000002 DEXAMETHASONE SODIUM PHOSPHATE 10 MG/ML SOLUTION

## 2025-01-06 PROCEDURE — 63710000001 FAMOTIDINE 20 MG TABLET: Performed by: NEUROLOGICAL SURGERY

## 2025-01-06 PROCEDURE — 76000 FLUOROSCOPY <1 HR PHYS/QHP: CPT

## 2025-01-06 PROCEDURE — 25010000002 CEFAZOLIN PER 500 MG: Performed by: NEUROLOGICAL SURGERY

## 2025-01-06 PROCEDURE — 25010000002 PHENYLEPHRINE 10 MG/ML SOLUTION 1 ML VIAL

## 2025-01-06 DEVICE — AVITENE ULTRAFOAM, 8 CM X 12.5 CM (3-1/8" X 5"), 100 SQ CM
Type: IMPLANTABLE DEVICE | Site: BACK | Status: FUNCTIONAL
Brand: AVITENE

## 2025-01-06 DEVICE — FLOSEAL WITH RECOTHROM - 5ML
Type: IMPLANTABLE DEVICE | Site: BACK | Status: FUNCTIONAL
Brand: FLOSEAL HEMOSTATIC MATRIX

## 2025-01-06 DEVICE — SSC BONE WAX
Type: IMPLANTABLE DEVICE | Site: BACK | Status: FUNCTIONAL
Brand: SSC BONE WAX

## 2025-01-06 RX ORDER — FAMOTIDINE 20 MG/1
20 TABLET, FILM COATED ORAL ONCE
Status: CANCELLED | OUTPATIENT
Start: 2025-01-06 | End: 2025-01-06

## 2025-01-06 RX ORDER — MAGNESIUM HYDROXIDE 1200 MG/15ML
LIQUID ORAL AS NEEDED
Status: DISCONTINUED | OUTPATIENT
Start: 2025-01-06 | End: 2025-01-06 | Stop reason: HOSPADM

## 2025-01-06 RX ORDER — PROPOFOL 10 MG/ML
VIAL (ML) INTRAVENOUS AS NEEDED
Status: DISCONTINUED | OUTPATIENT
Start: 2025-01-06 | End: 2025-01-06 | Stop reason: SURG

## 2025-01-06 RX ORDER — ONDANSETRON 2 MG/ML
4 INJECTION INTRAMUSCULAR; INTRAVENOUS ONCE AS NEEDED
Status: DISCONTINUED | OUTPATIENT
Start: 2025-01-06 | End: 2025-01-06 | Stop reason: HOSPADM

## 2025-01-06 RX ORDER — FENTANYL CITRATE 50 UG/ML
50 INJECTION, SOLUTION INTRAMUSCULAR; INTRAVENOUS
Status: DISCONTINUED | OUTPATIENT
Start: 2025-01-06 | End: 2025-01-06 | Stop reason: HOSPADM

## 2025-01-06 RX ORDER — ONDANSETRON 2 MG/ML
INJECTION INTRAMUSCULAR; INTRAVENOUS AS NEEDED
Status: DISCONTINUED | OUTPATIENT
Start: 2025-01-06 | End: 2025-01-06 | Stop reason: SURG

## 2025-01-06 RX ORDER — BUPIVACAINE HCL/0.9 % NACL/PF 0.125 %
PLASTIC BAG, INJECTION (ML) EPIDURAL AS NEEDED
Status: DISCONTINUED | OUTPATIENT
Start: 2025-01-06 | End: 2025-01-06 | Stop reason: SURG

## 2025-01-06 RX ORDER — HYDRALAZINE HYDROCHLORIDE 20 MG/ML
5 INJECTION INTRAMUSCULAR; INTRAVENOUS
Status: DISCONTINUED | OUTPATIENT
Start: 2025-01-06 | End: 2025-01-06 | Stop reason: HOSPADM

## 2025-01-06 RX ORDER — FENTANYL CITRATE 50 UG/ML
INJECTION, SOLUTION INTRAMUSCULAR; INTRAVENOUS AS NEEDED
Status: DISCONTINUED | OUTPATIENT
Start: 2025-01-06 | End: 2025-01-06 | Stop reason: SURG

## 2025-01-06 RX ORDER — MIDAZOLAM HYDROCHLORIDE 1 MG/ML
0.5 INJECTION, SOLUTION INTRAMUSCULAR; INTRAVENOUS
Status: DISCONTINUED | OUTPATIENT
Start: 2025-01-06 | End: 2025-01-06 | Stop reason: HOSPADM

## 2025-01-06 RX ORDER — SODIUM CHLORIDE 0.9 % (FLUSH) 0.9 %
3-10 SYRINGE (ML) INJECTION AS NEEDED
Status: DISCONTINUED | OUTPATIENT
Start: 2025-01-06 | End: 2025-01-06 | Stop reason: HOSPADM

## 2025-01-06 RX ORDER — EPHEDRINE SULFATE 50 MG/ML
INJECTION INTRAVENOUS AS NEEDED
Status: DISCONTINUED | OUTPATIENT
Start: 2025-01-06 | End: 2025-01-06 | Stop reason: SURG

## 2025-01-06 RX ORDER — HYDROCODONE BITARTRATE AND ACETAMINOPHEN 5; 325 MG/1; MG/1
1 TABLET ORAL ONCE AS NEEDED
Status: DISCONTINUED | OUTPATIENT
Start: 2025-01-06 | End: 2025-01-06 | Stop reason: HOSPADM

## 2025-01-06 RX ORDER — SODIUM CHLORIDE 0.9 % (FLUSH) 0.9 %
3 SYRINGE (ML) INJECTION EVERY 12 HOURS SCHEDULED
Status: DISCONTINUED | OUTPATIENT
Start: 2025-01-06 | End: 2025-01-06 | Stop reason: HOSPADM

## 2025-01-06 RX ORDER — DEXAMETHASONE SODIUM PHOSPHATE 10 MG/ML
INJECTION, SOLUTION INTRAMUSCULAR; INTRAVENOUS AS NEEDED
Status: DISCONTINUED | OUTPATIENT
Start: 2025-01-06 | End: 2025-01-06 | Stop reason: SURG

## 2025-01-06 RX ORDER — SODIUM CHLORIDE 9 MG/ML
9 INJECTION, SOLUTION INTRAVENOUS AS NEEDED
Status: DISCONTINUED | OUTPATIENT
Start: 2025-01-06 | End: 2025-01-06 | Stop reason: HOSPADM

## 2025-01-06 RX ORDER — SODIUM CHLORIDE, SODIUM LACTATE, POTASSIUM CHLORIDE, CALCIUM CHLORIDE 600; 310; 30; 20 MG/100ML; MG/100ML; MG/100ML; MG/100ML
9 INJECTION, SOLUTION INTRAVENOUS CONTINUOUS
Status: DISCONTINUED | OUTPATIENT
Start: 2025-01-07 | End: 2025-01-06 | Stop reason: HOSPADM

## 2025-01-06 RX ORDER — SODIUM CHLORIDE, SODIUM LACTATE, POTASSIUM CHLORIDE, CALCIUM CHLORIDE 600; 310; 30; 20 MG/100ML; MG/100ML; MG/100ML; MG/100ML
9 INJECTION, SOLUTION INTRAVENOUS CONTINUOUS
Status: DISCONTINUED | OUTPATIENT
Start: 2025-01-06 | End: 2025-01-06 | Stop reason: HOSPADM

## 2025-01-06 RX ORDER — LABETALOL HYDROCHLORIDE 5 MG/ML
5 INJECTION, SOLUTION INTRAVENOUS
Status: DISCONTINUED | OUTPATIENT
Start: 2025-01-06 | End: 2025-01-06 | Stop reason: HOSPADM

## 2025-01-06 RX ORDER — HYDROCODONE BITARTRATE AND ACETAMINOPHEN 5; 325 MG/1; MG/1
1 TABLET ORAL 3 TIMES DAILY PRN
Qty: 15 TABLET | Refills: 0 | Status: SHIPPED | OUTPATIENT
Start: 2025-01-06

## 2025-01-06 RX ORDER — HYDROMORPHONE HYDROCHLORIDE 1 MG/ML
0.5 INJECTION, SOLUTION INTRAMUSCULAR; INTRAVENOUS; SUBCUTANEOUS
Status: DISCONTINUED | OUTPATIENT
Start: 2025-01-06 | End: 2025-01-06 | Stop reason: HOSPADM

## 2025-01-06 RX ORDER — LIDOCAINE HYDROCHLORIDE 10 MG/ML
INJECTION, SOLUTION EPIDURAL; INFILTRATION; INTRACAUDAL; PERINEURAL AS NEEDED
Status: DISCONTINUED | OUTPATIENT
Start: 2025-01-06 | End: 2025-01-06 | Stop reason: SURG

## 2025-01-06 RX ORDER — SODIUM CHLORIDE 0.9 % (FLUSH) 0.9 %
10 SYRINGE (ML) INJECTION EVERY 12 HOURS SCHEDULED
Status: DISCONTINUED | OUTPATIENT
Start: 2025-01-06 | End: 2025-01-06 | Stop reason: HOSPADM

## 2025-01-06 RX ORDER — FAMOTIDINE 10 MG/ML
20 INJECTION, SOLUTION INTRAVENOUS ONCE
Status: CANCELLED | OUTPATIENT
Start: 2025-01-06 | End: 2025-01-06

## 2025-01-06 RX ORDER — HYDROCODONE BITARTRATE AND ACETAMINOPHEN 5; 325 MG/1; MG/1
TABLET ORAL
Status: COMPLETED
Start: 2025-01-06 | End: 2025-01-06

## 2025-01-06 RX ORDER — ROCURONIUM BROMIDE 10 MG/ML
INJECTION, SOLUTION INTRAVENOUS AS NEEDED
Status: DISCONTINUED | OUTPATIENT
Start: 2025-01-06 | End: 2025-01-06 | Stop reason: SURG

## 2025-01-06 RX ORDER — LIDOCAINE HYDROCHLORIDE 10 MG/ML
0.5 INJECTION, SOLUTION EPIDURAL; INFILTRATION; INTRACAUDAL; PERINEURAL ONCE AS NEEDED
Status: COMPLETED | OUTPATIENT
Start: 2025-01-06 | End: 2025-01-06

## 2025-01-06 RX ORDER — GLYCOPYRROLATE 0.2 MG/ML
INJECTION INTRAMUSCULAR; INTRAVENOUS AS NEEDED
Status: DISCONTINUED | OUTPATIENT
Start: 2025-01-06 | End: 2025-01-06 | Stop reason: SURG

## 2025-01-06 RX ORDER — IPRATROPIUM BROMIDE AND ALBUTEROL SULFATE 2.5; .5 MG/3ML; MG/3ML
3 SOLUTION RESPIRATORY (INHALATION) ONCE AS NEEDED
Status: DISCONTINUED | OUTPATIENT
Start: 2025-01-06 | End: 2025-01-06 | Stop reason: HOSPADM

## 2025-01-06 RX ORDER — SODIUM CHLORIDE 0.9 % (FLUSH) 0.9 %
10 SYRINGE (ML) INJECTION AS NEEDED
Status: DISCONTINUED | OUTPATIENT
Start: 2025-01-06 | End: 2025-01-06 | Stop reason: HOSPADM

## 2025-01-06 RX ORDER — FAMOTIDINE 20 MG/1
20 TABLET, FILM COATED ORAL
Status: COMPLETED | OUTPATIENT
Start: 2025-01-06 | End: 2025-01-06

## 2025-01-06 RX ORDER — BUPIVACAINE HYDROCHLORIDE AND EPINEPHRINE 2.5; 5 MG/ML; UG/ML
INJECTION, SOLUTION EPIDURAL; INFILTRATION; INTRACAUDAL; PERINEURAL AS NEEDED
Status: DISCONTINUED | OUTPATIENT
Start: 2025-01-06 | End: 2025-01-06 | Stop reason: HOSPADM

## 2025-01-06 RX ADMIN — LIDOCAINE HYDROCHLORIDE 50 MG: 10 INJECTION, SOLUTION EPIDURAL; INFILTRATION; INTRACAUDAL; PERINEURAL at 10:18

## 2025-01-06 RX ADMIN — ROCURONIUM BROMIDE 10 MG: 10 INJECTION INTRAVENOUS at 10:52

## 2025-01-06 RX ADMIN — HYDROCODONE BITARTRATE AND ACETAMINOPHEN 1 TABLET: 5; 325 TABLET ORAL at 11:59

## 2025-01-06 RX ADMIN — SUGAMMADEX 300 MG: 100 INJECTION, SOLUTION INTRAVENOUS at 11:08

## 2025-01-06 RX ADMIN — LIDOCAINE HYDROCHLORIDE 0.5 ML: 10 INJECTION, SOLUTION EPIDURAL; INFILTRATION; INTRACAUDAL; PERINEURAL at 10:07

## 2025-01-06 RX ADMIN — SODIUM CHLORIDE, POTASSIUM CHLORIDE, SODIUM LACTATE AND CALCIUM CHLORIDE 9 ML/HR: 600; 310; 30; 20 INJECTION, SOLUTION INTRAVENOUS at 10:07

## 2025-01-06 RX ADMIN — MUPIROCIN: 20 OINTMENT TOPICAL at 10:09

## 2025-01-06 RX ADMIN — FAMOTIDINE 20 MG: 20 TABLET, FILM COATED ORAL at 10:09

## 2025-01-06 RX ADMIN — PROPOFOL 200 MG: 10 INJECTION, EMULSION INTRAVENOUS at 10:18

## 2025-01-06 RX ADMIN — GLYCOPYRROLATE 0.1 MCG: 1 INJECTION INTRAMUSCULAR; INTRAVENOUS at 10:17

## 2025-01-06 RX ADMIN — PHENYLEPHRINE HYDROCHLORIDE 20 MCG/MIN: 10 INJECTION INTRAVENOUS at 10:32

## 2025-01-06 RX ADMIN — DEXAMETHASONE SODIUM PHOSPHATE 10 MG: 10 INJECTION INTRAMUSCULAR; INTRAVENOUS at 10:18

## 2025-01-06 RX ADMIN — SODIUM CHLORIDE 2 G: 900 INJECTION INTRAVENOUS at 10:24

## 2025-01-06 RX ADMIN — EPHEDRINE SULFATE 5 MG: 50 INJECTION INTRAVENOUS at 10:41

## 2025-01-06 RX ADMIN — Medication 100 MCG: at 10:32

## 2025-01-06 RX ADMIN — FENTANYL CITRATE 100 MCG: 50 INJECTION, SOLUTION INTRAMUSCULAR; INTRAVENOUS at 10:18

## 2025-01-06 RX ADMIN — EPHEDRINE SULFATE 5 MG: 50 INJECTION INTRAVENOUS at 11:08

## 2025-01-06 RX ADMIN — ROCURONIUM BROMIDE 50 MG: 10 INJECTION INTRAVENOUS at 10:18

## 2025-01-06 RX ADMIN — ONDANSETRON 4 MG: 2 INJECTION INTRAMUSCULAR; INTRAVENOUS at 11:06

## 2025-01-06 NOTE — OP NOTE
NEUROSURGICAL OPERATIVE NOTE        PREOPERATIVE DIAGNOSIS:    Recurrent left L4-5 disc herniation and stenosis      POSTOPERATIVE DIAGNOSIS:  Same      PROCEDURE:  Redo left L4-5 laminotomy, medial facetectomy, foraminotomy, and discectomy      SURGEON:  Paulino Comer M.D.      ASSISTANT: Ilana Henning PA-C    PAC assisted with:   Suctioning   Retraction   Tying   Suturing   Closing   Application of dressing   Skilled neurosurgery PA assistance was necessary to perform this procedure.        ANESTHESIA:  General      ESTIMATED BLOOD LOSS: Minimal      SPECIMEN: None      DRAINS: None      COMPLICATIONS:  None      CLINICAL NOTE:  Patient is an 79-year-old gentleman who is well-known to my service.  In April 2024 he underwent decompressive laminectomies at L2-3 and L3-4.  Remotely in the 1980s he had undergone left L4-5 discectomy by another provider.  He has generally done well but has developed progressive back and left leg pain extending into the left lateral calf.  This has been debilitating.  Studies demonstrate recess and foraminal stenosis with some degree of recurrent disc protrusion on the left at L4-5.  As such, he presents at this time for redo decompression on the left at L4-5.  The nature of the procedure as well as the potential risks, complications, limitations, and alternatives to the procedure were discussed at length with the patient and the patient has agreed to proceed with surgery.        TECHNICAL NOTE:  The patient was brought to the operating room and while on his cart, general endotracheal anesthesia was achieved.  He was then turned prone onto the Cloward saddle frame.  Special care was ensured to protect pressure points.  His low back was prepared and draped in usual fashion.  A localizing radiograph was obtained with a spinal needle in the lumbosacral midline utilizing the C-arm.  Based on this, a 3 cm vertical incision was fashioned overlying the L4-5 level.  Underlying tissues were  divided with cautery to provide exposure to the left L4 and L5 hemilamina.  Soft tissues were taken down and laminotomy was extended upward.  Another radiograph confirmed the operative level.  Significant granulation tissue was taken down.  The medial aspect of the facet was resected and undercut with Kerrison punches.  The neural foramen was decompressed with Kerrison punches.  The nerve root and dural sac were retracted medially over subligamentous disk herniation.  The dural sac was somewhat scarred to the disk material.  This was carefully mobilized using blunt dissection.  I incised into the disk and removed disk material.  This allowed for relaxation and good decompression of the thecal sac.  Modest bleeding points were controlled with bipolar cautery.  At completion of the procedure, the dural sac and nerve root were well decompressed and angled ball probe could readily be passed along the nerve root in the foramen.  Bleeding points were controlled with bipolar cautery and Floseal.  With the Valsalva maneuver, there was no significant bleeding or evidence of CSF leak. The wound was swashed out with saline solution.  The paraspinous muscle and fascia were reapproximated in interrupted fashion with 0 Vicryl suture.  Marcaine 0.25% was instilled in the paraspinous musculature and subcutaneous tissues.  Subcutaneous tissues were closed in layers with 3-0 Vicryl suture.  The skin was closed in a running subcuticular fashion with 3-0 Vicryl suture.  A dermal sealant and sterile dressing were applied.  He was rolled onto his cart, extubated, and taken to the recovery room in satisfactory condition.              Paulino Comer M.D.

## 2025-01-06 NOTE — ANESTHESIA PREPROCEDURE EVALUATION
Anesthesia Evaluation     Patient summary reviewed and Nursing notes reviewed   no history of anesthetic complications:   NPO Solid Status: > 8 hours  NPO Liquid Status: > 8 hours           Airway   Mallampati: II  TM distance: >3 FB  Neck ROM: full  No difficulty expected  Dental      Pulmonary - negative pulmonary ROS and normal exam   Cardiovascular - normal exam    (+) hypertension      Neuro/Psych  (+) headaches, numbness, psychiatric history  GI/Hepatic/Renal/Endo    (+) renal disease-    Musculoskeletal     Abdominal    Substance History      OB/GYN          Other                    Anesthesia Plan    ASA 2     general     intravenous induction     Anesthetic plan, risks, benefits, and alternatives have been provided, discussed and informed consent has been obtained with: patient.    Plan discussed with CRNA.    CODE STATUS:

## 2025-01-06 NOTE — H&P
"  Patient Care Team:      Chief complaint: Back and left leg pain    Subjective:  Patient is a 79 y.o.male presents with a previous history of back surgery.  1980 he had L4L5 discectomy on left with good results. 2024 he had L2L3L4 laminectomy with good results. 4 months ago began back and left radiating leg pain. No recent changes in his general health.    Review of Systems:  General ROS: negative  Cardiovascular ROS: no chest pain or dyspnea on exertion  Respiratory ROS: no cough, shortness of breath, or wheezing      Allergies:   Allergies   Allergen Reactions    Diclo Gel [Diclofenac Sodium] Rash    Nsaids Other (See Comments)     \"Can't take NSAIDS because of my kidneys\" (early stages of CKD)    Salonpas [Camphor-Menthol-Methyl Sal] Hives          Latex: no  Contrast Dye: no    Home Meds    Medications Prior to Admission   Medication Sig Dispense Refill Last Dose/Taking    Chlorhexidine Gluconate 4 % solution Shower each day with solution for 5 days beginning 5 days before surgery. 120 mL 0     cholecalciferol (VITAMIN D3) 25 MCG (1000 UT) tablet Take 1 tablet by mouth Daily.       HYDROcodone-acetaminophen (NORCO) 5-325 MG per tablet Take 1 tablet by mouth 2 (Two) Times a Day As Needed for Moderate Pain. 30 tablet 0     losartan (COZAAR) 100 MG tablet Take 1 tablet by mouth Daily. 90 tablet 3     mupirocin (BACTROBAN) 2 % nasal ointment Apply to the inside of each nostril with a cotton swab two times daily, morning and evening, for 5 days before surgery. 10 each 0      PMH:   Past Medical History:   Diagnosis Date    Abnormal ECG 10/20/2022    Allergic rhinitis     Environmental    Ankle sprain     Arthritis     Arthritis of back     BPH (benign prostatic hyperplasia)     Bursitis of hip     CKD (chronic kidney disease), stage III     Colon polyp     Sessile adenomatous colon polyp    CTS (carpal tunnel syndrome)     Diverticulosis     Gout     Hemorrhoids     Hip arthrosis     Hypertension     Knee swelling  "    Low back pain     Low back strain     Lumbosacral disc disease     Periarthritis of shoulder     Peripheral neuropathy     Renal insufficiency     Shingles     Stress headaches     Tendinitis of knee     Wears glasses      PSH:    Past Surgical History:   Procedure Laterality Date    APPENDECTOMY      BACK SURGERY Left 1980    Left L4-5 lumbar discectomy    CARPAL TUNNEL RELEASE Left 04/23/2021    Left carpal tunnel release; Dr. LELAND Nelson; [facility]    CARPAL TUNNEL RELEASE Right 04/2020    COLONOSCOPY      EPIDURAL BLOCK      FOOT SURGERY Right     Calcium deposit removed from right foot.    HYDROCELE EXCISION / REPAIR      JOINT REPLACEMENT      Left Knee    LUMBAR LAMINECTOMY DISCECTOMY DECOMPRESSION Bilateral 04/22/2024    Procedure: LUMBAR LAMINCECTOMY DECOMPRESSION, L2-3, L4-5;  Surgeon: Paulino Comer MD;  Location: [facility];  Service: Neurosurgery;  Laterality: Bilateral;    SKIN TAG REMOVAL      Multiple    TOTAL KNEE ARTHROPLASTY Left 02/01/2023    Procedure: TOTAL KNEE ARTHROPLASTY - LEFT;  Surgeon: Jeison Nelson MD;  Location: [facility];  Service: Orthopedics;  Laterality: Left;    VASECTOMY       Immunization History: pneumo: yes   Flu: yes  Tetanus: unknown  Social History:   Tobacco: former   Alcohol: no      Physical Exam:There were no vitals taken for this visit.      General Appearance:    Alert, cooperative, no distress, appears stated age   Head:    Normocephalic, without obvious abnormality, atraumatic   Lungs:     Clear to auscultation bilaterally, respirations unlabored    Heart: Regular rate and rhythm, S1 and S2 normal, no murmur, rub    or gallop    Abdomen:    Soft without tenderness   Breast Exam:    deferred   Genitalia:    deferred   Extremities:   Extremities normal, atraumatic, no cyanosis or edema   Skin:   Skin color, texture, turgor normal, no rashes or lesions   Neurologic:   Grossly intact     Results Review: EKG, CBC on chart    Impression: Left L4L5  herniated disc//stenosis    Plan: For lumbar discectomy redo left foraminotomy today  LEVI Candelario 1/6/2025 09:49 EST

## 2025-01-06 NOTE — ANESTHESIA POSTPROCEDURE EVALUATION
Patient: Julian Yan    Procedure Summary       Date: 01/06/25 [facility]    Anesthesia Start: 1014 Anesthesia Stop: 1120    Procedure: REDO LAMINOTOMY, FORAMINOTOMY AND DISCECTOMY L4-5 (Spine Lumbar) Diagnosis:       HNP (herniated nucleus pulposus), lumbar      (HNP (herniated nucleus pulposus), lumbar [M51.26])    Surgeons: Paulino Comer MD Provider: Faisal Meade MD    Anesthesia Type: general ASA Status: 2            Anesthesia Type: general    Vitals  Vitals Value Taken Time   /72 01/06/25 1120   Temp 97 °F (36.1 °C) 01/06/25 1120   Pulse 77 01/06/25 1120   Resp 14 01/06/25 1120   SpO2 96 % 01/06/25 1120           Post Anesthesia Care and Evaluation    Patient location during evaluation: PACU  Patient participation: complete - patient participated  Level of consciousness: awake and alert  Pain score: 0  Pain management: adequate    Airway patency: patent  Anesthetic complications: No anesthetic complications  PONV Status: none  Cardiovascular status: hemodynamically stable and acceptable  Respiratory status: nonlabored ventilation, acceptable and nasal cannula  Hydration status: acceptable    Comments: Pt arrived to PACU with no issues during transport. Pt placed directly to monitors. Vital signs are within parameters. Pt maintaining ventilation spontaneously. No changes to dental. Report given to PACU and all question and concerns were addressed as well as the plan of care.

## 2025-01-08 ENCOUNTER — DOCUMENTATION (OUTPATIENT)
Dept: NEUROSURGERY | Facility: CLINIC | Age: 80
End: 2025-01-08
Payer: MEDICARE

## 2025-01-08 NOTE — PROGRESS NOTES
Neurosurgery    I have spoken with Mr. Yan by telephone.  Yesterday was a rough day.  He had more pain after surgery than he was having before that.  Ms. Henning prescribed some steroids and a muscle relaxant and he feels much better today.  He is does still have some pain extending into the left leg.    Paulino Comer M.D.

## 2025-01-23 ENCOUNTER — TELEPHONE (OUTPATIENT)
Dept: NEUROSURGERY | Facility: CLINIC | Age: 80
End: 2025-01-23
Payer: MEDICARE

## 2025-01-23 NOTE — TELEPHONE ENCOUNTER
"Provider: Juvencio    Surgery/Procedure: Surgery with Paulino Comer MD (01/06/2025)     Last visit: Office Visit with Paulino Comer MD (12/18/2024)     Next visit: Appointment with Ilana Henning PA-C (01/24/2025)      Reason for call: Just FYI      Day After Surgery Status  (Newest Message First)  View All Conversations on this Encounter  Julian John Yuriy \"Pj\"  P e Neurosurg [facility] [place] (supporting Paulino Comer MD)14 hours ago (8:36 PM)       Wanted to give you an update prior to my follow up on Friday.   I have followed post surgery instructions religiously.   However, if you will re-read my message from the day following surgery, that reflects exactly where I am today.  I look forward to meeting with Dr. Comer on Friday to get his assessment.   Ilana Castillo PA-C Hayes, Jeffrey, ILEANA2 weeks ago     TH    I spoke with the patient as I was there for his surgery yesterday. I did use the nerve root retractor and suspect that it is irritated from the surgery. I've called in a medrol dose pack and some tizanidine as he describes his pain as spasms in his leg.  At his request, I spoke directly with his pharmacy to make sure they had these in stock. Could you please call him and let him know the medications are ordered at his pharmacy and that they had them there.  Thank you  Amanda Montgomery PA-C Hayes, Jeffrey, RN2 weeks ago       I discussed this with Dr Comer. He will give patient a call tomorrow          Note         Greyson Ward, RN  Jenny Bond PA-C; Ilana Henning PA-C; Amanda Mcneal PA-C2 weeks ago     JH  I talked to him on the phone - he wants to talk directly to Dr. Comer or anyone that was in on the surgery.    He continues to have the same radiating/sciatic pain he had prior to surgery, somewhat worse actually. No incisional pain but starts in his hip/butt and radiates to his thigh and is a \"terrible\" pain. " "When he stands he has an intense initial wave of pain that hits, and he has to wait for it to subside before he walks. He uses a cane to walk, but at times is needing a walker after this surgery - no real weakness but his pain is severe and he needs additional support. He states he is aware he is not supposed to sit upright at a 90 degree angle, but with his sciatic pain that is the only way he gets pain relief.    He denies any numbness or tingling, or any weakness. Also denies any bowel or bladder symptoms.    I tried to explain to him that each case is different, but that we do not always have immediate relief with nerve pain, and sometimes that actually takes longer to heal than the incisional pain. He wants Dr. Comer to call him to explain to him if he has had bruising or if he saw anything during surgery that might cause this pain to continue, because he did not get to speak to him after surgery.     Julian Yan \"Pj\"  P e Neurosurg [facility] [address] (supporting Paulino Comer MD)2 weeks ago       Dr. Comer,  Everything with the incision is fine. No pain related to the surgery itself.  However, nothing has changed with the sciatic nerve pain.   I still have constant pain in the calf (3-5), and shooting pain (7-8) starting near the hip and going into my thigh with certain activities: cough, sneeze, getting out of bed unless I get  legs in fetal position to roll to sit up position, etc.  I'm wondering if this is due to nerve damage that needs time to heal or what else might be causing it.  Thank  you.  Pj Yan       "

## 2025-01-24 ENCOUNTER — OFFICE VISIT (OUTPATIENT)
Dept: NEUROSURGERY | Facility: CLINIC | Age: 80
End: 2025-01-24
Payer: MEDICARE

## 2025-01-24 VITALS
DIASTOLIC BLOOD PRESSURE: 70 MMHG | WEIGHT: 198.5 LBS | SYSTOLIC BLOOD PRESSURE: 120 MMHG | BODY MASS INDEX: 31.16 KG/M2 | TEMPERATURE: 97.3 F | HEIGHT: 67 IN

## 2025-01-24 DIAGNOSIS — M70.62 TROCHANTERIC BURSITIS OF LEFT HIP: Primary | ICD-10-CM

## 2025-01-24 DIAGNOSIS — M51.26 LUMBAR DISC HERNIATION: ICD-10-CM

## 2025-01-24 DIAGNOSIS — M47.26 OSTEOARTHRITIS OF SPINE WITH RADICULOPATHY, LUMBAR REGION: ICD-10-CM

## 2025-01-24 PROCEDURE — 3078F DIAST BP <80 MM HG: CPT | Performed by: PHYSICIAN ASSISTANT

## 2025-01-24 PROCEDURE — 3074F SYST BP LT 130 MM HG: CPT | Performed by: PHYSICIAN ASSISTANT

## 2025-01-24 PROCEDURE — 99024 POSTOP FOLLOW-UP VISIT: CPT | Performed by: PHYSICIAN ASSISTANT

## 2025-01-24 RX ORDER — METHYLPREDNISOLONE 4 MG/1
TABLET ORAL
Qty: 1 EACH | Refills: 0 | Status: SHIPPED | OUTPATIENT
Start: 2025-01-24

## 2025-01-24 NOTE — PROGRESS NOTES
Subjective     Chief Complaint: back pain with left buttock/hip and left calf pain    Patient ID: Julian Yan is a 79 y.o. male is here today for follow-up.    Post-op Follow-up  Pain Control:  Poorly controlled  Fever:  No fever  Diet:  Adequate intake  Activity:  Severely limited  Operative Site Issues: No    Additional information:  There is no difference in my pain from before surgery.    Patient is an 79-year-old gentleman who is well-known to my service.  In April 2024 he underwent decompressive laminectomies at L2-3 and L3-4.  Remotely in the 1980s he had undergone left L4-5 discectomy by another provider.  He had generally done well but has developed progressive back and left leg pain extending into the left lateral calf that became debilitating.  Studies demonstrated recess and foraminal stenosis with some degree of recurrent disc protrusion on the left at L4-5.  As such, he presented on 1/7/25 for redo decompression on the left at L4-5.   Since surgery, he has contacted our office a few times noting that his symptoms had not changed at all. With the expectation that nerve root retraction may cause some of his pain to linger, he was treated with a medrol dose pack and muscle relaxer. He cannot take NSAIDS and since finishing the steroids, he has used tylenol and tylenol PM at night for pain. The steroids did not give any lasting relief. He is ambulating with a cane. He also has left hip/buttock pain that is worst with sitting or reclining.   He denies fever, chills, or problems with his incision     The following portions of the patient's history were reviewed and updated as appropriate: allergies, current medications, past family history, past medical history, past social history, past surgical history and problem list.    Family history:   Family History   Problem Relation Age of Onset    COPD Mother     Hypertension Mother     Heart failure Father     Diabetes Father     Osteoarthritis Sister      Osteoarthritis Brother     Drug abuse Son     Early death Son         Drug overdose    Early death Daughter         Accident caused by DUI     Alcohol abuse Daughter        Social history:   Social History     Socioeconomic History    Marital status:     Number of children: 5   Tobacco Use    Smoking status: Former     Current packs/day: 0.00     Average packs/day: 0.3 packs/day for 7.4 years (2.5 ttl pk-yrs)     Types: Cigarettes     Start date: 9/3/1962     Quit date: 1967     Years since quittin.7     Passive exposure: Past    Smokeless tobacco: Never    Tobacco comments:     Haven't smoked since    Vaping Use    Vaping status: Never Used   Substance and Sexual Activity    Alcohol use: No    Drug use: Never    Sexual activity: Not Currently     Partners: Female     Birth control/protection: Vasectomy       Review of Systems   Constitutional:  Negative for activity change, appetite change, chills, diaphoresis, fatigue, fever and unexpected weight change.   HENT:  Negative for congestion, dental problem, drooling, ear discharge, ear pain, facial swelling, hearing loss, mouth sores, nosebleeds, postnasal drip, rhinorrhea, sinus pressure, sinus pain, sneezing, sore throat, tinnitus, trouble swallowing and voice change.    Eyes:  Negative for photophobia, pain, discharge, redness, itching and visual disturbance.   Respiratory:  Negative for apnea, cough, choking, chest tightness, shortness of breath, wheezing and stridor.    Cardiovascular:  Negative for chest pain, palpitations and leg swelling.   Gastrointestinal:  Negative for abdominal distention, abdominal pain, anal bleeding, blood in stool, constipation, diarrhea, nausea, rectal pain and vomiting.   Endocrine: Negative for cold intolerance, heat intolerance, polydipsia, polyphagia and polyuria.   Genitourinary:  Negative for decreased urine volume, difficulty urinating, dysuria, enuresis, flank pain, frequency, genital sores,  "hematuria and urgency.   Musculoskeletal:  Positive for arthralgias (buttock and left calf). Negative for back pain, gait problem, joint swelling, myalgias, neck pain and neck stiffness.   Skin:  Negative for color change, pallor, poor wound healing, rash and wound.   Allergic/Immunologic: Negative for environmental allergies, food allergies and immunocompromised state.   Neurological:  Negative for dizziness, tremors, seizures, syncope, facial asymmetry, speech difficulty, weakness, light-headedness, numbness and headaches.   Hematological:  Negative for adenopathy. Does not bruise/bleed easily.   Psychiatric/Behavioral:  Negative for agitation, behavioral problems, confusion, decreased concentration, dysphoric mood, hallucinations, self-injury, sleep disturbance and suicidal ideas. The patient is not nervous/anxious and is not hyperactive.        Objective   Blood pressure 120/70, temperature 97.3 °F (36.3 °C), temperature source Infrared, height 170.2 cm (67\"), weight 90 kg (198 lb 8 oz).  Body mass index is 31.09 kg/m².    Physical Exam  Constitutional:       Appearance: Normal appearance.   Eyes:      Pupils: Pupils are equal, round, and reactive to light.   Cardiovascular:      Pulses: Normal pulses.   ____Pulmonary:      Effort: Pulmonary effort is normal.      Breath sounds: Normal breath sounds.   Musculoskeletal:      Comments: Gait slow and antalgic, but steady and independent with no foot drop.    There is no swelling, redness, or tenderness to palpation of the left calf.   Skin:     Comments: Incision clean, dry, and intact with no swelling, tenderness, or erythema.    _Neurological:      General: No focal deficit present.      Mental Status:   alert and oriented to person, place, and time.   Psychiatric:         Mood and Affect: Mood normal.         Behavior: Behavior normal.    Assessment & Plan   Dr Comer got adequate decompression of the L4/5 nerve root during surgery, however, Mr Yan has pain " that he feels is really unchanged from before surgery. I will get xrays of the hip as he has pain there and does not have imaging in our system of the hip since 2017.   It is still relatively early after surgery and I am hopeful that his symptoms will diminish. I will follow up with him in 2 weeks. He is not wanting to pursue physical therapy or [facility]DR[facility] at this time.     Diagnoses and all orders for this visit:    1. Trochanteric bursitis of left hip (Primary)  -     XR hip w or wo pelvis 2-3 view left; Future    2. Lumbar disc herniation    3. Osteoarthritis of spine with radiculopathy, lumbar region        Return in about 2 weeks (around 2/7/2025).           This document signed by Ilana Henning PA-C  January 24, 2025 11:31 EST

## 2025-01-27 DIAGNOSIS — M54.16 LUMBAR RADICULOPATHY: ICD-10-CM

## 2025-01-28 NOTE — TELEPHONE ENCOUNTER
Provider:  Anyi  Surgery/Procedure:    REDO LAMINOTOMY, FORAMINOTOMY AND DISCECTOMY L4-5     Surgery/Procedure Date:  1/6/25  Last visit:   1/24/25  Next visit: 2/11/25     Reason for call:  Refill request for Norco 5.     Kp:    1 01/06/2025 [id number] Hydrocodone Bitartrate/Ac   325MG/5MG  [facility] L737 15.00 5 15 04 [place]  New 01/06/2025 [facility]  1 12/09/2024 [id number] Hydrocodone Bitartrate/Ac   325MG/5MG  [facility] L737 30.00 15 10 04 [place]  New 12/09/2024 [facility]  1 11/15/2024 [id number] Gabapentin 300MG [facility] L737 90.00 30 04 [place]  New 11/08/2024 [facility]  1 11/13/2024 [id number] Hydrocodone Bitartrate/Ac   325MG/5MG  [facility] L737 30.00 15 10 04 [place]  New 11/13/2024 [facility]

## 2025-01-29 ENCOUNTER — HOSPITAL ENCOUNTER (OUTPATIENT)
Dept: GENERAL RADIOLOGY | Facility: HOSPITAL | Age: 80
Discharge: HOME | End: 2025-01-29
Admitting: PHYSICIAN ASSISTANT
Payer: MEDICARE

## 2025-01-29 DIAGNOSIS — M70.62 TROCHANTERIC BURSITIS OF LEFT HIP: ICD-10-CM

## 2025-01-29 PROCEDURE — 73502 X-RAY EXAM HIP UNI 2-3 VIEWS: CPT

## 2025-01-31 RX ORDER — HYDROCODONE BITARTRATE AND ACETAMINOPHEN 5; 325 MG/1; MG/1
1 TABLET ORAL 3 TIMES DAILY PRN
Qty: 15 TABLET | Refills: 0 | Status: SHIPPED | OUTPATIENT
Start: 2025-01-31

## 2025-02-18 ENCOUNTER — HOSPITAL ENCOUNTER (OUTPATIENT)
Dept: GENERAL RADIOLOGY | Facility: HOSPITAL | Age: 80
Discharge: HOME | End: 2025-02-18
Admitting: ORTHOPAEDIC SURGERY
Payer: MEDICARE

## 2025-02-18 ENCOUNTER — TRANSCRIBE ORDERS (OUTPATIENT)
Dept: ADMINISTRATIVE | Facility: HOSPITAL | Age: 80
End: 2025-02-18
Payer: MEDICARE

## 2025-02-18 DIAGNOSIS — M47.816 LUMBAR SPONDYLOSIS: Primary | ICD-10-CM

## 2025-02-18 PROCEDURE — 72100 X-RAY EXAM L-S SPINE 2/3 VWS: CPT

## 2025-02-18 PROCEDURE — 72072 X-RAY EXAM THORAC SPINE 3VWS: CPT

## 2025-03-06 ENCOUNTER — OFFICE VISIT (OUTPATIENT)
Dept: ORTHOPEDIC SURGERY | Facility: CLINIC | Age: 80
End: 2025-03-06
Payer: MEDICARE

## 2025-03-06 VITALS
HEIGHT: 67 IN | WEIGHT: 202 LBS | DIASTOLIC BLOOD PRESSURE: 70 MMHG | BODY MASS INDEX: 31.71 KG/M2 | SYSTOLIC BLOOD PRESSURE: 118 MMHG

## 2025-03-06 DIAGNOSIS — Z96.652 STATUS POST TOTAL LEFT KNEE REPLACEMENT: Primary | ICD-10-CM

## 2025-03-06 NOTE — PROGRESS NOTES
[facility] Note        Subjective     CC: Follow-up (1 year follow up--- 2 years S/P TOTAL KNEE ARTHROPLASTY - LEFT (DOS: 2023))      HPI    Julian Yan is a 79 y.o. male.  Patient is here today for 2-year follow-up after left knee replacement on 2023.  Has had quite an ordeal with his lumbar spine over the past year or so.  Scheduled to see Dr. Laguna in [place] for possible surgery in the next several months.  Has described pain he is having in his left lower extremity that comes and goes.  Always has about a level 2 out of 10 pain in his left knee.  Nonfocal.    Overall, patient's symptoms are as above    ROS:    Constiutional:Pt denies fever, chills, nausea, or vomiting.  MSK:as above        Objective      Past Medical History  Past Medical History:   Diagnosis Date    Abnormal ECG 10/20/2022    Allergic rhinitis     Environmental    Ankle sprain     Arthritis     Arthritis of back     BPH (benign prostatic hyperplasia)     Bursitis of hip     CKD (chronic kidney disease), stage III     Colon polyp     Sessile adenomatous colon polyp    CTS (carpal tunnel syndrome)     Diverticulosis     Gout     Hemorrhoids     Hip arthrosis     Hypertension     Knee swelling     Low back pain     Low back strain     Lumbosacral disc disease     Periarthritis of shoulder     Peripheral neuropathy     Renal insufficiency     Shingles     Stress headaches     Tendinitis of knee     Wears glasses      Social History     Socioeconomic History    Marital status:     Number of children: 5   Tobacco Use    Smoking status: Former     Current packs/day: 0.00     Average packs/day: 0.3 packs/day for 7.4 years (2.5 ttl pk-yrs)     Types: Cigarettes     Start date: 9/3/1962     Quit date: 1967     Years since quittin.8     Passive exposure: Past    Smokeless tobacco: Never    Tobacco comments:     Haven't smoked since    Vaping Use    Vaping status: Never Used   Substance  "and Sexual Activity    Alcohol use: No    Drug use: Never    Sexual activity: Not Currently     Partners: Female     Birth control/protection: Vasectomy          Physical Exam  /70   Ht 170.2 cm (67.01\")   Wt 91.6 kg (202 lb)   BMI 31.63 kg/m²     Body mass index is 31.63 kg/m².    Patient is well nourished and well developed.        Ortho Exam    Lower Extremity:     Inspection and Palpation:      Left knee:  Calf:  Soft and non tender  Effusion:  None  Pulses:  2+  Warmth:  None  Incision:  Healed     ROM:  Left:  Extension:10    Flexion:120      Deformities/Malalignments/Discrepancies:    Left:  none    Functional Testing:  Left:  Straight Leg Raise: 5/5  Patella Tracking:  Normal      Imaging/Labs/EMG Reviewed and Interpreted:  Imaging Results (Last 24 Hours)       Procedure Component Value Units Date/Time    XR Knee 3+ View With [place] Left [[id number]] Resulted: 03/06/25 1055     Updated: 03/06/25 1055    Narrative:      Knee X-ray    Indication: status-post TKA    Study:  AP, Lateral, and Sunrise views of Left knee    Comparison: Left knee 2/13/2024    Findings:  No signs of acute fracture are visualized  No signs of loosening are appreciated  Components are well aligned    Impression:  Status post Left cemented total knee arthroplasty. No signs   of loosening or fracture.                Assessment    Assessment:  1. Status post total left knee replacement        Plan:  Recommend over the counter anti-inflammatories for pain and/or swelling  Status post left TKA--patient overall seems to be doing okay from his left knee replacement.  There is a soft flexion contracture which I think is secondary to his tight hamstrings associated with his low back condition.  I want to see him back in a year with x-rays or sooner if necessary.  Continue indefinite antibiotic prophylaxis.      Jeison Nelson MD  03/06/25  11:06 EST      Dictated Utilizing Dragon Dictation.  "

## 2025-04-16 ENCOUNTER — OFFICE VISIT (OUTPATIENT)
Dept: FAMILY MEDICINE CLINIC | Facility: CLINIC | Age: 80
End: 2025-04-16
Payer: MEDICARE

## 2025-04-16 VITALS
DIASTOLIC BLOOD PRESSURE: 74 MMHG | OXYGEN SATURATION: 97 % | BODY MASS INDEX: 32.65 KG/M2 | HEART RATE: 61 BPM | WEIGHT: 208 LBS | HEIGHT: 67 IN | SYSTOLIC BLOOD PRESSURE: 114 MMHG

## 2025-04-16 DIAGNOSIS — M54.16 LUMBAR RADICULOPATHY: ICD-10-CM

## 2025-04-16 RX ORDER — GABAPENTIN 300 MG/1
300 CAPSULE ORAL DAILY
COMMUNITY
Start: 2025-03-25 | End: 2025-04-24

## 2025-04-16 RX ORDER — HYDROCODONE BITARTRATE AND ACETAMINOPHEN 5; 325 MG/1; MG/1
1 TABLET ORAL DAILY PRN
Qty: 15 TABLET | Refills: 0 | Status: SHIPPED | OUTPATIENT
Start: 2025-04-16

## 2025-04-16 NOTE — PROGRESS NOTES
Chief Complaint   Patient presents with    Med Refill     Needing new prescription for hydrocodone.    Back pain    HPI:  Julian Yan is a 79 y.o. male who presents today for back pain.  He would like to discuss hydrocodone prescription.  Currently taking this as needed.  Has upcoming neurosurgery.    ROS:  Constitutional: no fevers, night sweats or unexplained weight loss  Eyes: no vision changes  ENT: no runny nose, ear pain, sore throat  Cardio: no chest pain, palpitations  Pulm: no shortness of breath, wheezing, or cough  GI: no abdominal pain or changes in bowel movements  : no difficulty urinating  MSK: no difficulty ambulating, no joint pain  Neuro: no weakness, dizziness or headache  Psych: no trouble sleeping  Endo: no change in appetite      Past Medical History:   Diagnosis Date    Abnormal ECG 10/20/2022    Allergic rhinitis     Environmental    Ankle sprain     Arthritis     Arthritis of back     BPH (benign prostatic hyperplasia)     Bursitis of hip     CKD (chronic kidney disease), stage III     Colon polyp     Sessile adenomatous colon polyp    CTS (carpal tunnel syndrome)     Diverticulosis     Gout     Hemorrhoids     Hip arthrosis     Hypertension     Knee swelling     Low back pain     Low back strain     Lumbosacral disc disease     Periarthritis of shoulder     Peripheral neuropathy     Renal insufficiency     Shingles     Stress headaches     Tendinitis of knee     Wears glasses       Family History   Problem Relation Age of Onset    COPD Mother     Hypertension Mother     Heart failure Father     Diabetes Father     Osteoarthritis Sister     Osteoarthritis Brother     Drug abuse Son     Early death Son         Drug overdose    Early death Daughter         Accident caused by DUI     Alcohol abuse Daughter       Social History     Socioeconomic History    Marital status:     Number of children: 5   Tobacco Use    Smoking status: Former     Current packs/day: 0.00      "Average packs/day: 0.3 packs/day for 7.4 years (2.5 ttl pk-yrs)     Types: Cigarettes     Start date: 9/3/1962     Quit date: 1967     Years since quittin.9     Passive exposure: Past    Smokeless tobacco: Never    Tobacco comments:     Haven't smoked since    Vaping Use    Vaping status: Never Used   Substance and Sexual Activity    Alcohol use: No    Drug use: Never    Sexual activity: Not Currently     Partners: Female     Birth control/protection: Vasectomy      Allergies   Allergen Reactions    Diclo Gel [Diclofenac Sodium] Rash    Nsaids Other (See Comments)     \"Can't take NSAIDS because of my kidneys\" (early stages of CKD)    Salonpas [Camphor-Menthol-Methyl Matt] Hives      Immunization History   Administered Date(s) Administered    COVID-19 (MODERNA) 1st,2nd,3rd Dose Monovalent 2021, 2021, 10/23/2021    COVID-19 (MODERNA) Monovalent Original Booster 2022, 10/17/2022    Influenza TIV (IM) 10/19/2011    Influenza, Unspecified 10/09/2018, 10/23/2019, 10/30/2020    Pneumococcal Conjugate 13-Valent (PCV13) 2015    Pneumococcal Conjugate 20-Valent (PCV20) 2022    Pneumococcal Polysaccharide (PPSV23) 10/28/2011    Tdap 10/19/2011    Zostavax 2016        PE:  Vitals:    25 1420   BP: 114/74   Pulse: 61   SpO2: 97%      Body mass index is 32.57 kg/m².    Gen Appearance: NAD  HEENT: Normocephalic, PERRLA, no thyromegaly, trache midline  Heart: RRR, normal S1 and S2, no murmur  Lungs: CTA b/l, no wheezing, no crackles  Abdomen: Soft, non-tender, non-distended, no guarding and BSx4  MSK: Moves all extremities well, normal gait, no peripheral edema  Pulses: Palpable and equal b/l  Lymph nodes: No palpable lymphadenopathy   Neuro: No focal deficits      Current Outpatient Medications   Medication Sig Dispense Refill    cholecalciferol (VITAMIN D3) 25 MCG (1000 UT) tablet Take 1 tablet by mouth Daily.      diphenhydrAMINE-acetaminophen (TYLENOL PM)  MG tablet " per tablet Take 1 tablet by mouth At Night As Needed for Sleep.      gabapentin (NEURONTIN) 300 MG capsule Take 1 capsule by mouth Daily.      HYDROcodone-acetaminophen (NORCO) 5-325 MG per tablet Take 1 tablet by mouth Daily As Needed for Moderate Pain. 15 tablet 0    losartan (COZAAR) 100 MG tablet Take 1 tablet by mouth Daily. 90 tablet 3     No current facility-administered medications for this visit.      Counseling was given to patient for the following topics: diagnostic results and impressions . Total time of the encounter was 40 minutes and 20 minutes was spent face to face counseling.    Reviewed MRI and discussed symptoms with patient at length.  Currently taking hydrocodone as needed.  Has used 15 tablets since January.  UDS and CSA today.    Diagnoses and all orders for this visit:    1. Lumbar radiculopathy  -     HYDROcodone-acetaminophen (NORCO) 5-325 MG per tablet; Take 1 tablet by mouth Daily As Needed for Moderate Pain.  Dispense: 15 tablet; Refill: 0         No follow-ups on file.     Dictated Utilizing Dragon Dictation    Please note that portions of this note were completed with a voice recognition program.    Part of this note may be an electronic transcription/translation of spoken language to printed text using the Dragon Dictation System.

## 2025-06-18 ENCOUNTER — TRANSCRIBE ORDERS (OUTPATIENT)
Dept: CARDIOLOGY | Facility: HOSPITAL | Age: 80
End: 2025-06-18
Payer: MEDICARE

## 2025-06-18 ENCOUNTER — LAB (OUTPATIENT)
Dept: LAB | Facility: HOSPITAL | Age: 80
End: 2025-06-18
Payer: MEDICARE

## 2025-06-18 ENCOUNTER — TRANSCRIBE ORDERS (OUTPATIENT)
Dept: LAB | Facility: HOSPITAL | Age: 80
End: 2025-06-18
Payer: MEDICARE

## 2025-06-18 ENCOUNTER — HOSPITAL ENCOUNTER (OUTPATIENT)
Dept: CARDIOLOGY | Facility: HOSPITAL | Age: 80
Discharge: HOME | End: 2025-06-18
Payer: MEDICARE

## 2025-06-18 DIAGNOSIS — M48.04 MYELOPATHY CONCURRENT WITH AND DUE TO SPINAL STENOSIS OF THORACIC REGION: Primary | ICD-10-CM

## 2025-06-18 DIAGNOSIS — G99.2 MYELOPATHY CONCURRENT WITH AND DUE TO SPINAL STENOSIS OF THORACIC REGION: Primary | ICD-10-CM

## 2025-06-18 DIAGNOSIS — M48.04 SPINAL STENOSIS OF THORACIC REGION: Primary | ICD-10-CM

## 2025-06-18 DIAGNOSIS — G99.2 MYELOPATHY CONCURRENT WITH AND DUE TO SPINAL STENOSIS OF THORACIC REGION: ICD-10-CM

## 2025-06-18 DIAGNOSIS — M48.04 SPINAL STENOSIS OF THORACIC REGION: ICD-10-CM

## 2025-06-18 DIAGNOSIS — M48.04 MYELOPATHY CONCURRENT WITH AND DUE TO SPINAL STENOSIS OF THORACIC REGION: ICD-10-CM

## 2025-06-18 LAB
ALBUMIN SERPL-MCNC: 4.1 G/DL (ref 3.5–5.2)
ALBUMIN/GLOB SERPL: 1.6 G/DL
ALP SERPL-CCNC: 97 U/L (ref 39–117)
ALT SERPL W P-5'-P-CCNC: 14 U/L (ref 1–41)
ANION GAP SERPL CALCULATED.3IONS-SCNC: 11.3 MMOL/L (ref 5–15)
APTT PPP: 32.5 SECONDS (ref 22–39)
AST SERPL-CCNC: 16 U/L (ref 1–40)
BASOPHILS # BLD AUTO: 0.07 10*3/MM3 (ref 0–0.2)
BASOPHILS NFR BLD AUTO: 1 % (ref 0–1.5)
BILIRUB SERPL-MCNC: 0.3 MG/DL (ref 0–1.2)
BUN SERPL-MCNC: 27 MG/DL (ref 8–23)
BUN/CREAT SERPL: 14.4 (ref 7–25)
CALCIUM SPEC-SCNC: 9 MG/DL (ref 8.6–10.5)
CHLORIDE SERPL-SCNC: 105 MMOL/L (ref 98–107)
CO2 SERPL-SCNC: 23.7 MMOL/L (ref 22–29)
CREAT SERPL-MCNC: 1.88 MG/DL (ref 0.76–1.27)
DEPRECATED RDW RBC AUTO: 43.4 FL (ref 37–54)
EGFRCR SERPLBLD CKD-EPI 2021: 35.4 ML/MIN/1.73
EOSINOPHIL # BLD AUTO: 0.32 10*3/MM3 (ref 0–0.4)
EOSINOPHIL NFR BLD AUTO: 4.5 % (ref 0.3–6.2)
ERYTHROCYTE [DISTWIDTH] IN BLOOD BY AUTOMATED COUNT: 12 % (ref 12.3–15.4)
GLOBULIN UR ELPH-MCNC: 2.5 GM/DL
GLUCOSE SERPL-MCNC: 102 MG/DL (ref 65–99)
HBA1C MFR BLD: 5.5 % (ref 4.8–5.6)
HCT VFR BLD AUTO: 42 % (ref 37.5–51)
HGB BLD-MCNC: 13.6 G/DL (ref 13–17.7)
IMM GRANULOCYTES # BLD AUTO: 0.01 10*3/MM3 (ref 0–0.05)
IMM GRANULOCYTES NFR BLD AUTO: 0.1 % (ref 0–0.5)
INR PPP: 0.97 (ref 0.89–1.12)
LYMPHOCYTES # BLD AUTO: 0.82 10*3/MM3 (ref 0.7–3.1)
LYMPHOCYTES NFR BLD AUTO: 11.5 % (ref 19.6–45.3)
MCH RBC QN AUTO: 31.6 PG (ref 26.6–33)
MCHC RBC AUTO-ENTMCNC: 32.4 G/DL (ref 31.5–35.7)
MCV RBC AUTO: 97.7 FL (ref 79–97)
MONOCYTES # BLD AUTO: 0.54 10*3/MM3 (ref 0.1–0.9)
MONOCYTES NFR BLD AUTO: 7.6 % (ref 5–12)
NEUTROPHILS NFR BLD AUTO: 5.36 10*3/MM3 (ref 1.7–7)
NEUTROPHILS NFR BLD AUTO: 75.3 % (ref 42.7–76)
NRBC BLD AUTO-RTO: 0 /100 WBC (ref 0–0.2)
PLATELET # BLD AUTO: 247 10*3/MM3 (ref 140–450)
PMV BLD AUTO: 10 FL (ref 6–12)
POTASSIUM SERPL-SCNC: 4.5 MMOL/L (ref 3.5–5.2)
PROT SERPL-MCNC: 6.6 G/DL (ref 6–8.5)
PROTHROMBIN TIME: 13.5 SECONDS (ref 12.2–15.3)
QT INTERVAL: 394 MS
QTC INTERVAL: 399 MS
RBC # BLD AUTO: 4.3 10*6/MM3 (ref 4.14–5.8)
SODIUM SERPL-SCNC: 140 MMOL/L (ref 136–145)
WBC NRBC COR # BLD AUTO: 7.12 10*3/MM3 (ref 3.4–10.8)

## 2025-06-18 PROCEDURE — 85610 PROTHROMBIN TIME: CPT

## 2025-06-18 PROCEDURE — 83036 HEMOGLOBIN GLYCOSYLATED A1C: CPT

## 2025-06-18 PROCEDURE — 36415 COLL VENOUS BLD VENIPUNCTURE: CPT

## 2025-06-18 PROCEDURE — 93005 ELECTROCARDIOGRAM TRACING: CPT | Performed by: ORTHOPAEDIC SURGERY

## 2025-06-18 PROCEDURE — 85025 COMPLETE CBC W/AUTO DIFF WBC: CPT

## 2025-06-18 PROCEDURE — 85730 THROMBOPLASTIN TIME PARTIAL: CPT

## 2025-06-18 PROCEDURE — 80053 COMPREHEN METABOLIC PANEL: CPT

## 2025-06-27 ENCOUNTER — READMISSION MANAGEMENT (OUTPATIENT)
Dept: CALL CENTER | Facility: HOSPITAL | Age: 80
End: 2025-06-27
Payer: MEDICARE

## 2025-06-27 NOTE — OUTREACH NOTE
Prep Survey      Flowsheet Row Responses   [facility] patient discharged from? Non-[facility]   Is LACE score < 7 ? 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 Other   Prep survey completed? Yes            Rosemary BRENNER - Registered Nurse

## 2025-06-30 ENCOUNTER — TRANSITIONAL CARE MANAGEMENT TELEPHONE ENCOUNTER (OUTPATIENT)
Dept: CALL CENTER | Facility: HOSPITAL | Age: 80
End: 2025-06-30
Payer: MEDICARE

## 2025-06-30 NOTE — OUTREACH NOTE
Call Center TCM Note      Flowsheet Row Responses   [facility] patient discharged from? Non-  [Ivor]   Does the patient have one of the following disease processes/diagnoses(primary or secondary)? Other   TCM attempt successful? Yes   Call start time 1439   Call end time 1440   Discharge diagnosis S/P back surgery due myelopathy concurrent with and due to spinal stenosis of thoracic region   Does the patient have all medications ordered at discharge? Yes   Is the patient taking all medications as directed (includes completed medication regime)? Yes   Does the patient have an appointment with their PCP within 7-14 days of discharge? No   Nursing Interventions Patient desires to follow up with specialty only   Has home health visited the patient within 72 hours of discharge? N/A   Psychosocial issues? No   What is the patient's perception of their health status since discharge? Improving   Is the patient/caregiver able to teach back signs and symptoms related to disease process for when to call PCP? Yes   Is the patient/caregiver able to teach back signs and symptoms related to disease process for when to call 911? Yes   Is the patient/caregiver able to teach back the hierarchy of who to call/visit for symptoms/problems? PCP, Specialist, Home health nurse, Urgent Care, ED, 911 Yes   If the patient is a current smoker, are they able to teach back resources for cessation? Not a smoker   TCM call completed? Yes   Wrap up additional comments Doing well, denies any questions or concerns, states he will be following up with his surgeon.   Call end time 1440   Would this patient benefit from a Referral to Amb Social Work? No   Is the patient interested in additional calls from an ambulatory ? No            Vicky Ott Registered Nurse    6/30/2025, 14:40 EDT

## 2025-08-13 ENCOUNTER — LAB (OUTPATIENT)
Dept: LAB | Facility: HOSPITAL | Age: 80
End: 2025-08-13
Payer: MEDICARE

## 2025-08-13 ENCOUNTER — OFFICE VISIT (OUTPATIENT)
Dept: FAMILY MEDICINE CLINIC | Facility: CLINIC | Age: 80
End: 2025-08-13
Payer: MEDICARE

## 2025-08-13 VITALS
HEART RATE: 62 BPM | OXYGEN SATURATION: 97 % | SYSTOLIC BLOOD PRESSURE: 124 MMHG | WEIGHT: 202.6 LBS | DIASTOLIC BLOOD PRESSURE: 78 MMHG | HEIGHT: 67 IN | BODY MASS INDEX: 31.8 KG/M2

## 2025-08-13 DIAGNOSIS — M25.569 KNEE PAIN, UNSPECIFIED CHRONICITY, UNSPECIFIED LATERALITY: ICD-10-CM

## 2025-08-13 DIAGNOSIS — I10 ESSENTIAL HYPERTENSION: ICD-10-CM

## 2025-08-13 DIAGNOSIS — E55.9 VITAMIN D DEFICIENCY: ICD-10-CM

## 2025-08-13 DIAGNOSIS — Z12.5 ENCOUNTER FOR PROSTATE CANCER SCREENING: ICD-10-CM

## 2025-08-13 DIAGNOSIS — M54.16 LUMBAR RADICULOPATHY: ICD-10-CM

## 2025-08-13 DIAGNOSIS — Z00.00 MEDICARE ANNUAL WELLNESS VISIT, SUBSEQUENT: Primary | ICD-10-CM

## 2025-08-13 DIAGNOSIS — N18.32 STAGE 3B CHRONIC KIDNEY DISEASE: ICD-10-CM

## 2025-08-13 DIAGNOSIS — Z12.11 COLON CANCER SCREENING: ICD-10-CM

## 2025-08-13 LAB
25(OH)D3 SERPL-MCNC: 44 NG/ML (ref 30–100)
ALBUMIN SERPL-MCNC: 4.2 G/DL (ref 3.5–5.2)
ALBUMIN/GLOB SERPL: 1.4 G/DL
ALP SERPL-CCNC: 117 U/L (ref 39–117)
ALT SERPL W P-5'-P-CCNC: 11 U/L (ref 1–41)
ANION GAP SERPL CALCULATED.3IONS-SCNC: 12 MMOL/L (ref 5–15)
AST SERPL-CCNC: 17 U/L (ref 1–40)
BACTERIA UR QL AUTO: ABNORMAL /HPF
BASOPHILS # BLD AUTO: 0.06 10*3/MM3 (ref 0–0.2)
BASOPHILS NFR BLD AUTO: 0.8 % (ref 0–1.5)
BILIRUB SERPL-MCNC: 0.4 MG/DL (ref 0–1.2)
BILIRUB UR QL STRIP: NEGATIVE
BUN SERPL-MCNC: 30 MG/DL (ref 8–23)
BUN/CREAT SERPL: 15.2 (ref 7–25)
CALCIUM SPEC-SCNC: 9.5 MG/DL (ref 8.6–10.5)
CHLORIDE SERPL-SCNC: 105 MMOL/L (ref 98–107)
CHOLEST SERPL-MCNC: 158 MG/DL (ref 0–200)
CLARITY UR: CLEAR
CO2 SERPL-SCNC: 22 MMOL/L (ref 22–29)
COLOR UR: YELLOW
CREAT SERPL-MCNC: 1.97 MG/DL (ref 0.76–1.27)
DEPRECATED RDW RBC AUTO: 43.3 FL (ref 37–54)
EGFRCR SERPLBLD CKD-EPI 2021: 33.5 ML/MIN/1.73
EOSINOPHIL # BLD AUTO: 0.2 10*3/MM3 (ref 0–0.4)
EOSINOPHIL NFR BLD AUTO: 2.7 % (ref 0.3–6.2)
ERYTHROCYTE [DISTWIDTH] IN BLOOD BY AUTOMATED COUNT: 12.7 % (ref 12.3–15.4)
GLOBULIN UR ELPH-MCNC: 3 GM/DL
GLUCOSE SERPL-MCNC: 91 MG/DL (ref 65–99)
GLUCOSE UR STRIP-MCNC: NEGATIVE MG/DL
HBA1C MFR BLD: 5.7 % (ref 4.8–5.6)
HCT VFR BLD AUTO: 39.5 % (ref 37.5–51)
HDLC SERPL-MCNC: 44 MG/DL (ref 40–60)
HGB BLD-MCNC: 13.1 G/DL (ref 13–17.7)
HGB UR QL STRIP.AUTO: NEGATIVE
HOLD SPECIMEN: NORMAL
HYALINE CASTS UR QL AUTO: ABNORMAL /LPF
IMM GRANULOCYTES # BLD AUTO: 0.03 10*3/MM3 (ref 0–0.05)
IMM GRANULOCYTES NFR BLD AUTO: 0.4 % (ref 0–0.5)
KETONES UR QL STRIP: ABNORMAL
LDLC SERPL CALC-MCNC: 95 MG/DL (ref 0–100)
LDLC/HDLC SERPL: 2.13 {RATIO}
LEUKOCYTE ESTERASE UR QL STRIP.AUTO: ABNORMAL
LYMPHOCYTES # BLD AUTO: 0.85 10*3/MM3 (ref 0.7–3.1)
LYMPHOCYTES NFR BLD AUTO: 11.4 % (ref 19.6–45.3)
MCH RBC QN AUTO: 31 PG (ref 26.6–33)
MCHC RBC AUTO-ENTMCNC: 33.2 G/DL (ref 31.5–35.7)
MCV RBC AUTO: 93.6 FL (ref 79–97)
MONOCYTES # BLD AUTO: 0.54 10*3/MM3 (ref 0.1–0.9)
MONOCYTES NFR BLD AUTO: 7.3 % (ref 5–12)
NEUTROPHILS NFR BLD AUTO: 5.75 10*3/MM3 (ref 1.7–7)
NEUTROPHILS NFR BLD AUTO: 77.4 % (ref 42.7–76)
NITRITE UR QL STRIP: NEGATIVE
NRBC BLD AUTO-RTO: 0 /100 WBC (ref 0–0.2)
PH UR STRIP.AUTO: 5.5 [PH] (ref 5–8)
PLATELET # BLD AUTO: 254 10*3/MM3 (ref 140–450)
PMV BLD AUTO: 9.5 FL (ref 6–12)
POTASSIUM SERPL-SCNC: 5 MMOL/L (ref 3.5–5.2)
PROT SERPL-MCNC: 7.2 G/DL (ref 6–8.5)
PROT UR QL STRIP: ABNORMAL
PSA SERPL-MCNC: 4.53 NG/ML (ref 0–4)
RBC # BLD AUTO: 4.22 10*6/MM3 (ref 4.14–5.8)
RBC # UR STRIP: ABNORMAL /HPF
REF LAB TEST METHOD: ABNORMAL
SODIUM SERPL-SCNC: 139 MMOL/L (ref 136–145)
SP GR UR STRIP: 1.02 (ref 1–1.03)
SQUAMOUS #/AREA URNS HPF: ABNORMAL /HPF
T4 FREE SERPL-MCNC: 1.2 NG/DL (ref 0.92–1.68)
TRIGL SERPL-MCNC: 102 MG/DL (ref 0–150)
TSH SERPL DL<=0.05 MIU/L-ACNC: 3.72 UIU/ML (ref 0.27–4.2)
UROBILINOGEN UR QL STRIP: ABNORMAL
VLDLC SERPL-MCNC: 19 MG/DL (ref 5–40)
WBC # UR STRIP: ABNORMAL /HPF
WBC NRBC COR # BLD AUTO: 7.43 10*3/MM3 (ref 3.4–10.8)

## 2025-08-13 PROCEDURE — 83036 HEMOGLOBIN GLYCOSYLATED A1C: CPT

## 2025-08-13 PROCEDURE — 82306 VITAMIN D 25 HYDROXY: CPT

## 2025-08-13 PROCEDURE — 81001 URINALYSIS AUTO W/SCOPE: CPT

## 2025-08-13 PROCEDURE — 85025 COMPLETE CBC W/AUTO DIFF WBC: CPT

## 2025-08-13 PROCEDURE — G0103 PSA SCREENING: HCPCS

## 2025-08-13 PROCEDURE — 80053 COMPREHEN METABOLIC PANEL: CPT

## 2025-08-13 PROCEDURE — 84439 ASSAY OF FREE THYROXINE: CPT

## 2025-08-13 PROCEDURE — 84443 ASSAY THYROID STIM HORMONE: CPT

## 2025-08-13 PROCEDURE — 87086 URINE CULTURE/COLONY COUNT: CPT

## 2025-08-13 PROCEDURE — 80061 LIPID PANEL: CPT

## 2025-08-14 DIAGNOSIS — I10 ESSENTIAL HYPERTENSION: ICD-10-CM

## 2025-08-14 RX ORDER — LOSARTAN POTASSIUM 100 MG/1
100 TABLET ORAL DAILY
Qty: 90 TABLET | Refills: 3 | Status: SHIPPED | OUTPATIENT
Start: 2025-08-14

## 2025-08-16 LAB — BACTERIA SPEC AEROBE CULT: NO GROWTH

## 2025-08-18 DIAGNOSIS — R97.20 ELEVATED PSA: Primary | ICD-10-CM

## 2025-08-19 ENCOUNTER — OFFICE VISIT (OUTPATIENT)
Dept: FAMILY MEDICINE CLINIC | Facility: CLINIC | Age: 80
End: 2025-08-19
Payer: MEDICARE

## 2025-08-19 VITALS
BODY MASS INDEX: 31.71 KG/M2 | SYSTOLIC BLOOD PRESSURE: 126 MMHG | HEART RATE: 62 BPM | WEIGHT: 202 LBS | HEIGHT: 67 IN | OXYGEN SATURATION: 97 % | DIASTOLIC BLOOD PRESSURE: 80 MMHG

## 2025-08-19 DIAGNOSIS — R97.20 ELEVATED PSA: ICD-10-CM

## 2025-08-19 DIAGNOSIS — Z51.81 THERAPEUTIC DRUG MONITORING: ICD-10-CM

## 2025-08-19 DIAGNOSIS — M79.645 PAIN OF LEFT THUMB: ICD-10-CM

## 2025-08-19 DIAGNOSIS — M54.16 LUMBAR RADICULOPATHY: Primary | ICD-10-CM

## 2025-08-19 RX ORDER — GABAPENTIN 300 MG/1
300 CAPSULE ORAL 2 TIMES DAILY
Qty: 60 CAPSULE | Refills: 2 | Status: SHIPPED | OUTPATIENT
Start: 2025-08-19

## 2025-08-22 ENCOUNTER — HOSPITAL ENCOUNTER (OUTPATIENT)
Dept: GENERAL RADIOLOGY | Facility: HOSPITAL | Age: 80
Discharge: HOME | End: 2025-08-22
Payer: MEDICARE

## 2025-08-22 DIAGNOSIS — M79.645 PAIN OF LEFT THUMB: ICD-10-CM

## 2025-08-22 PROCEDURE — 73130 X-RAY EXAM OF HAND: CPT

## 2025-08-25 LAB
DRUGS UR: NORMAL
SPECIMEN STATUS: NORMAL

## 2025-08-27 ENCOUNTER — OFFICE VISIT (OUTPATIENT)
Age: 80
End: 2025-08-27
Payer: MEDICARE

## 2025-08-27 VITALS
SYSTOLIC BLOOD PRESSURE: 121 MMHG | WEIGHT: 202 LBS | HEIGHT: 67 IN | DIASTOLIC BLOOD PRESSURE: 78 MMHG | BODY MASS INDEX: 31.71 KG/M2

## 2025-08-27 DIAGNOSIS — M18.12 ARTHRITIS OF CARPOMETACARPAL (CMC) JOINT OF LEFT THUMB: ICD-10-CM

## 2025-08-27 DIAGNOSIS — M65.312 TRIGGER FINGER OF LEFT THUMB: ICD-10-CM

## 2025-08-27 DIAGNOSIS — M77.8 TENDINITIS OF EXTENSOR TENDON OF HAND: Primary | ICD-10-CM

## 2025-08-27 RX ORDER — DEXAMETHASONE SODIUM PHOSPHATE 4 MG/ML
2 INJECTION, SOLUTION INTRA-ARTICULAR; INTRALESIONAL; INTRAMUSCULAR; INTRAVENOUS; SOFT TISSUE
Status: COMPLETED | OUTPATIENT
Start: 2025-08-27 | End: 2025-08-27

## 2025-08-27 RX ORDER — LIDOCAINE HYDROCHLORIDE 10 MG/ML
0.5 INJECTION, SOLUTION EPIDURAL; INFILTRATION; INTRACAUDAL; PERINEURAL
Status: COMPLETED | OUTPATIENT
Start: 2025-08-27 | End: 2025-08-27

## 2025-08-27 RX ADMIN — DEXAMETHASONE SODIUM PHOSPHATE 2 MG: 4 INJECTION, SOLUTION INTRA-ARTICULAR; INTRALESIONAL; INTRAMUSCULAR; INTRAVENOUS; SOFT TISSUE at 11:21

## 2025-08-27 RX ADMIN — LIDOCAINE HYDROCHLORIDE 0.5 ML: 10 INJECTION, SOLUTION EPIDURAL; INFILTRATION; INTRACAUDAL; PERINEURAL at 11:21

## (undated) DEVICE — TOOL MR8-14MH30D MR8 14CM MATCH DMD 3MM: Brand: MIDAS REX MR8

## (undated) DEVICE — ANTIBACTERIAL UNDYED BRAIDED (POLYGLACTIN 910), SYNTHETIC ABSORBABLE SUTURE: Brand: COATED VICRYL

## (undated) DEVICE — DRSNG WND BORDR/ADHS NONADHR/GZ LF 4X4IN STRL

## (undated) DEVICE — GLV SURG PREMIERPRO MIC LTX PF SZ6.5 BRN

## (undated) DEVICE — CABL BIPOL MEGADYNE 12FT DISP

## (undated) DEVICE — ADHS SKIN PREMIERPRO EXOFIN TOPICAL HI/VISC .5ML

## (undated) DEVICE — CONN TBG Y 5 IN 1 LF STRL

## (undated) DEVICE — GLV SURG PREMIERPRO MIC LTX PF SZ7 BRN

## (undated) DEVICE — SUT VIC PLS CTD ANTIB BR 3/0 8/18IN 45CM

## (undated) DEVICE — HDRST INTUB GENTLETOUCH SLOT 7IN RT

## (undated) DEVICE — PK NEURO DISC 10

## (undated) DEVICE — GLV SURG PREMIERPRO MIC LTX PF SZ7.5 BRN

## (undated) DEVICE — PATIENT RETURN ELECTRODE, SINGLE-USE, CONTACT QUALITY MONITORING, ADULT, WITH 9FT CORD, FOR PATIENTS WEIGING OVER 33LBS. (15KG): Brand: MEGADYNE

## (undated) DEVICE — STRAP POSTN KN/BDY FM 5X72IN DISP

## (undated) DEVICE — BLANKT WARM UPPR/BDY ARM/OUT 57X196CM

## (undated) DEVICE — C-ARM DRAPE: Brand: DEROYAL